# Patient Record
Sex: FEMALE | Race: WHITE | NOT HISPANIC OR LATINO | URBAN - METROPOLITAN AREA
[De-identification: names, ages, dates, MRNs, and addresses within clinical notes are randomized per-mention and may not be internally consistent; named-entity substitution may affect disease eponyms.]

---

## 2017-10-03 ENCOUNTER — EMERGENCY (EMERGENCY)
Facility: HOSPITAL | Age: 37
LOS: 0 days | Discharge: HOME | End: 2017-10-03

## 2017-10-03 DIAGNOSIS — R23.1 PALLOR: ICD-10-CM

## 2017-10-03 DIAGNOSIS — R42 DIZZINESS AND GIDDINESS: ICD-10-CM

## 2017-10-03 DIAGNOSIS — N39.0 URINARY TRACT INFECTION, SITE NOT SPECIFIED: ICD-10-CM

## 2018-01-22 ENCOUNTER — APPOINTMENT (OUTPATIENT)
Dept: OTOLARYNGOLOGY | Facility: CLINIC | Age: 38
End: 2018-01-22

## 2019-06-19 ENCOUNTER — APPOINTMENT (OUTPATIENT)
Dept: VASCULAR SURGERY | Facility: CLINIC | Age: 39
End: 2019-06-19
Payer: MEDICAID

## 2019-06-19 VITALS
DIASTOLIC BLOOD PRESSURE: 70 MMHG | SYSTOLIC BLOOD PRESSURE: 105 MMHG | HEIGHT: 66 IN | BODY MASS INDEX: 25.71 KG/M2 | WEIGHT: 160 LBS

## 2019-06-19 DIAGNOSIS — I83.813 VARICOSE VEINS OF BILATERAL LOWER EXTREMITIES WITH PAIN: ICD-10-CM

## 2019-06-19 DIAGNOSIS — I83.893 VARICOSE VEINS OF BILATERAL LOWER EXTREMITIES WITH OTHER COMPLICATIONS: ICD-10-CM

## 2019-06-19 DIAGNOSIS — Z78.9 OTHER SPECIFIED HEALTH STATUS: ICD-10-CM

## 2019-06-19 PROCEDURE — 93970 EXTREMITY STUDY: CPT

## 2019-06-19 PROCEDURE — 99203 OFFICE O/P NEW LOW 30 MIN: CPT

## 2019-06-19 RX ORDER — ASPIRIN 81 MG
81 TABLET,CHEWABLE ORAL
Refills: 0 | Status: ACTIVE | COMMUNITY

## 2019-06-19 NOTE — ASSESSMENT
[FreeTextEntry1] : 38 years old female with chronic b/l LE spider veins in thighs and legs. No leg swelling or pain. Venous duplex of both legs wad done today for swelling and varicose veins and showed no reflux in b/l GSV and no DVT. She has no indication for ablation and has mild veins. I offered sclerotherapy if she wishes for cosmetic reason, she is thinking. Prescribed stockings and leg elevation. She will f/u on as needed basis.

## 2019-06-19 NOTE — PHYSICAL EXAM
[Normal Breath Sounds] : Normal breath sounds [JVD] : no jugular venous distention  [Normal Heart Sounds] : normal heart sounds [2+] : left 2+ [Ankle Swelling (On Exam)] : not present [Varicose Veins Of Lower Extremities] : bilaterally [Ankle Swelling On The Right] : mild [Abdomen Masses] : No abdominal masses [] : not present [Abdominal Bruit] : no abdominal bruit  [Abdomen Tenderness] : ~T ~M No abdominal tenderness [FreeTextEntry1] : diffuse spider veins b/l thighs and legs. non tender

## 2019-06-19 NOTE — CONSULT LETTER
[Dear  ___] : Dear  [unfilled], [Please see my note below.] : Please see my note below. [Consult Closing:] : Thank you very much for allowing me to participate in the care of this patient.  If you have any questions, please do not hesitate to contact me. [Consult Letter:] : I had the pleasure of evaluating your patient, [unfilled].

## 2019-06-19 NOTE — REVIEW OF SYSTEMS
[Fever] : no fever [Chills] : no chills [Chest Pain] : no chest pain [Shortness Of Breath] : no shortness of breath [Abdominal Pain] : no abdominal pain [Constipation] : no constipation [Vomiting] : no vomiting [Diarrhea] : no diarrhea [Limb Pain] : no limb pain [Dizziness] : no dizziness [Limb Swelling] : no limb swelling

## 2019-06-19 NOTE — DATA REVIEWED
[FreeTextEntry1] : Venous duplex of both legs wad done today for swelling and varicose veins and showed no reflux in b/l GSV and no DVT.

## 2019-06-19 NOTE — HISTORY OF PRESENT ILLNESS
[FreeTextEntry1] : 38 years old female with chronic b/l LE spider veins in thighs and legs. No leg swelling or pain. Not using stockings. Was told by a physician that she needs closure of vein. No history of DVT or large varicose veins. Was told during pregnancy that she might have a tendency to form clots, no known hypercoagulable disease. \par

## 2019-06-19 NOTE — REASON FOR VISIT
[Consultation] : a consultation visit [FreeTextEntry1] : Elza is being seen today for spider varicose veins in both lower extremities

## 2021-09-14 ENCOUNTER — APPOINTMENT (OUTPATIENT)
Dept: UROLOGY | Facility: CLINIC | Age: 41
End: 2021-09-14
Payer: MEDICAID

## 2021-09-14 VITALS — HEIGHT: 66 IN | WEIGHT: 160 LBS | BODY MASS INDEX: 25.71 KG/M2

## 2021-09-14 DIAGNOSIS — F17.290 NICOTINE DEPENDENCE, OTHER TOBACCO PRODUCT, UNCOMPLICATED: ICD-10-CM

## 2021-09-14 PROCEDURE — 99204 OFFICE O/P NEW MOD 45 MIN: CPT

## 2021-09-14 NOTE — HISTORY OF PRESENT ILLNESS
[FreeTextEntry1] : MARLEY FRIEDMAN is a 41 year old female heavy smoker [employer], with past medical history of UTIs who presents for consultation for recurrent UTIs in the past 2 years. \par \par Pt is here with her step-daughter which also served as an  (Wolof-English). Pt declined official . \par Pt reports during the time of UTIs she is experiencing, headaches, dizziness, malaise, suprapubic pressure, increased urinary frequency, urgency and a sensation of incomplete emptying. \par Pt reports visits the Urgent care every time she experiences these symptoms and she gets Rxed ABx which she takes, with relief of symptoms and the symptoms reoccur 10 days after finishing the ABx. \par Denies gross hematuria, dysuria or associated symptoms at this time. \par Pt states in the past she used to wipe back to front and she recently changed from front to back as instructed by the PCP. \par \par Pt reports she consumes 3 coffees per day and states she doesn’t hydrate much during the day. \par Reports she is  and states has vaginal intercourse with her . \par \par  PMH: reports at least 1 UTI per year \par Family History: No  malignancies\par Social History: . Vaginal [place]. Consumes 3 coffees per day. Pt is COVID19 vaccinated. \par Hookah smoker. Allergic to Cipro. Daughter Pharmacy major. \par \par UCx: \par 8/26/2021: E.Coli resist to cipro/levaquin\par 7/08/2021: E.Coli \par 6/30/2021: E. Coli \par 5/26/2021 E.Coli  \par

## 2021-09-14 NOTE — PHYSICAL EXAM
[General Appearance - Well Developed] : well developed [General Appearance - Well Nourished] : well nourished [Normal Appearance] : normal appearance [Well Groomed] : well groomed [General Appearance - In No Acute Distress] : no acute distress [Edema] : no peripheral edema [Exaggerated Use Of Accessory Muscles For Inspiration] : no accessory muscle use [Respiration, Rhythm And Depth] : normal respiratory rhythm and effort [Abdomen Tenderness] : non-tender [Abdomen Soft] : soft [Costovertebral Angle Tenderness] : no ~M costovertebral angle tenderness [Urinary Bladder Findings] : the bladder was normal on palpation [Normal Station and Gait] : the gait and station were normal for the patient's age [] : no rash [No Focal Deficits] : no focal deficits [Oriented To Time, Place, And Person] : oriented to person, place, and time [Affect] : the affect was normal [Mood] : the mood was normal [Not Anxious] : not anxious [No Palpable Adenopathy] : no palpable adenopathy

## 2021-09-14 NOTE — ASSESSMENT
[FreeTextEntry1] : MARLEY FRIEDMAN is a 41 year old female heavy smoker [employer], with past medical history of UTIs who presents for consultation for recurrent UTIs in the past 2 years, culture confirmed ecoli.\par \par Currently asymptomatic.\par Next time she has symptoms  she can come in for a STRAIGHT CATHETER CULTURE\par \par Plan: \par RBUS \par UCx Straight Cath when she is experiencing UTI symptoms. \par UA, Urine Cytology, UCx today (no plans on tx if positive today unless symptomatic)\par STI panel \par Start theraCran \par Increase fluid intake \par Methenamine vs Low dose ABx prophylaxis in future

## 2021-09-14 NOTE — ADDENDUM
[FreeTextEntry1] : Patient's note was transcribed with the assistance of a medical scribe under the supervision of Dr. Sood.\par I, Dr. Sood, have reviewed the patient's chart and agree that it aligns with my medical decisions.\par Philippe Borden, our scribe, also served as a chaperone for physical examination purposes.\par \par \par

## 2021-09-16 ENCOUNTER — NON-APPOINTMENT (OUTPATIENT)
Age: 41
End: 2021-09-16

## 2021-09-16 LAB
C TRACH RRNA SPEC QL NAA+PROBE: NOT DETECTED
N GONORRHOEA RRNA SPEC QL NAA+PROBE: NOT DETECTED
SOURCE AMPLIFICATION: NORMAL
URINE CYTOLOGY: NORMAL

## 2021-09-17 ENCOUNTER — NON-APPOINTMENT (OUTPATIENT)
Age: 41
End: 2021-09-17

## 2021-09-17 LAB
SOURCE AMPLIFICATION: NORMAL
T VAGINALIS RRNA SPEC QL NAA+PROBE: NOT DETECTED

## 2021-09-20 ENCOUNTER — NON-APPOINTMENT (OUTPATIENT)
Age: 41
End: 2021-09-20

## 2021-09-22 ENCOUNTER — RESULT REVIEW (OUTPATIENT)
Age: 41
End: 2021-09-22

## 2021-09-22 ENCOUNTER — OUTPATIENT (OUTPATIENT)
Dept: OUTPATIENT SERVICES | Facility: HOSPITAL | Age: 41
LOS: 1 days | Discharge: HOME | End: 2021-09-22
Payer: COMMERCIAL

## 2021-09-22 DIAGNOSIS — N39.0 URINARY TRACT INFECTION, SITE NOT SPECIFIED: ICD-10-CM

## 2021-09-22 PROCEDURE — 76770 US EXAM ABDO BACK WALL COMP: CPT | Mod: 26

## 2021-09-23 ENCOUNTER — NON-APPOINTMENT (OUTPATIENT)
Age: 41
End: 2021-09-23

## 2021-10-05 ENCOUNTER — APPOINTMENT (OUTPATIENT)
Dept: UROLOGY | Facility: CLINIC | Age: 41
End: 2021-10-05
Payer: MEDICAID

## 2021-10-05 VITALS — HEIGHT: 66 IN | WEIGHT: 179 LBS | BODY MASS INDEX: 28.77 KG/M2

## 2021-10-05 PROCEDURE — 99214 OFFICE O/P EST MOD 30 MIN: CPT

## 2021-10-05 NOTE — HISTORY OF PRESENT ILLNESS
[FreeTextEntry1] : MARLEY FRIEDMAN is a 41 year old female heavy smoker [employer], with past medical history of UTIs who presents for consultation for recurrent UTIs in the past 2 years. \par Pt is here with her  which also served as an  (Thai-English). Pt declined official .\par \par Urine testing was negative for gonorrhea/chlamydia, trichomonas, with negative urinalysis.  Culture demonstrated E. coli however, patient was contacted and is asymptomatic.\par \par Renal/bladder ultrasound images visualized demonstrated no hydronephrosis bilaterally with a lower pole 7 x 6 mm focus, possibly nonobstructing stone, in the right lower pole. 9/2021\par \par Patient is doing well and denies any signs or symptoms of urinary infection at this time.\par \par Previously: \par  PMH: reports at least 1 UTI per year \par Family History: No  malignancies\par Social History: . Vaginal [place]. Consumes 3 coffees per day. Pt is COVID19 vaccinated. \par Hookah smoker. Allergic to Cipro. Daughter Pharmacy major. \par \par UCx: \par 8/26/2021: E.Coli resist to cipro/levaquin\par 7/08/2021: E.Coli \par 6/30/2021: E. Coli \par 5/26/2021 E.Coli  \par

## 2021-10-06 ENCOUNTER — OUTPATIENT (OUTPATIENT)
Dept: OUTPATIENT SERVICES | Facility: HOSPITAL | Age: 41
LOS: 1 days | Discharge: HOME | End: 2021-10-06

## 2021-10-07 DIAGNOSIS — K02.9 DENTAL CARIES, UNSPECIFIED: ICD-10-CM

## 2021-10-11 ENCOUNTER — APPOINTMENT (OUTPATIENT)
Dept: UROGYNECOLOGY | Facility: CLINIC | Age: 41
End: 2021-10-11

## 2021-10-19 ENCOUNTER — LABORATORY RESULT (OUTPATIENT)
Age: 41
End: 2021-10-19

## 2021-10-19 ENCOUNTER — APPOINTMENT (OUTPATIENT)
Dept: UROLOGY | Facility: CLINIC | Age: 41
End: 2021-10-19
Payer: MEDICAID

## 2021-10-19 VITALS — HEIGHT: 66 IN | WEIGHT: 179 LBS | BODY MASS INDEX: 28.77 KG/M2

## 2021-10-19 PROCEDURE — 99214 OFFICE O/P EST MOD 30 MIN: CPT

## 2021-10-22 NOTE — HISTORY OF PRESENT ILLNESS
[FreeTextEntry1] : MARLEY FRIEDMAN is a 41 year old female heavy smoker hookah, with past medical history of UTIs who presents for consultation for recurrent UTIs in the past 2 years. \par Pt is here with her  which also served as an  (Japanese-English). Pt declined official .\par \par Approximately 1 week ago she began experiencing dysuria and presented to her PCP who ordered urine culture.\par Urine culture demonstrated E. coli and she was given an unknown antibiotic. She did not take this medication and presents today for evaluation.\par Denies fever, chills, nausea/vomiting, abdominal/flank pain, or further constitutional symptoms.\par \par Additionally, she has history of renal stone.\par Renal ultrasound images visualized from 9/22/2021, demonstrates right lower pole stone, 7 x 6 mm. Urinary bladder demonstrates bilateral ureteral jets with a prevoid volume of 246 cc and the postvoid volume of 41 cc. No hydronephrosis bilaterally.\par \par KUB x-ray, from 10/12/2021 demonstrates a 7 mm calcification over the right renal shadow, which may represent calculus. \par \par Previously:\par Urine testing was negative for gonorrhea/chlamydia, trichomonas, with negative urinalysis.  Culture demonstrated E. coli however, patient was contacted and is asymptomatic.\par \par Renal/bladder ultrasound images visualized demonstrated no hydronephrosis bilaterally with a lower pole 7 x 6 mm focus, possibly nonobstructing stone, in the right lower pole. 9/2021\par \par  PMH: reports at least 1 UTI per year \par Family History: No  malignancies\par Social History: . Vaginal [place]. Consumes 3 coffees per day. Pt is COVID19 vaccinated. \par Hookah smoker. Allergic to Cipro. Daughter Pharmacy major. \par \par UCx: \par 8/26/2021: E.Coli resist to cipro/levaquin\par 7/08/2021: E.Coli \par 6/30/2021: E. Coli \par 5/26/2021 E.Coli  \par

## 2021-10-22 NOTE — ASSESSMENT
[FreeTextEntry1] : MARLEY FRIEDMAN is a 41 year old female heavy smoker [employer], with past medical history of UTIs who presents for consultation for recurrent UTIs in the past 2 years. \par Pt is here with her  which also served as an  (Turkmen-English). Pt declined official .\par Currently symptomatic. Urine culture demonstrates E. coli. Right 7 mm stone confirmed on KUB and renal ultrasound\par \par Recommended straight catheterization however, patient declined.\par \par -Start Macrobid 100 mg p.o. twice daily x7 days, as per her most recent urine culture.\par -UA C&S today\par -Consider low-dose antibiotic versus methenamine for prophylaxis\par -Patient will bring in disc containing the images of KUB x-ray for review\par -Telemedicine in 2 to 3 weeks to discuss definitive stone management\par

## 2021-10-26 ENCOUNTER — NON-APPOINTMENT (OUTPATIENT)
Age: 41
End: 2021-10-26

## 2021-10-26 LAB
APPEARANCE: CLEAR
BILIRUBIN URINE: NEGATIVE
BLOOD URINE: NORMAL
COLOR: YELLOW
GLUCOSE QUALITATIVE U: NEGATIVE
KETONES URINE: NEGATIVE
LEUKOCYTE ESTERASE URINE: ABNORMAL
NITRITE URINE: POSITIVE
PH URINE: 6
PROTEIN URINE: NORMAL
SPECIFIC GRAVITY URINE: 1.02
UROBILINOGEN URINE: NORMAL

## 2021-11-09 ENCOUNTER — APPOINTMENT (OUTPATIENT)
Dept: UROLOGY | Facility: CLINIC | Age: 41
End: 2021-11-09
Payer: MEDICAID

## 2021-11-09 PROCEDURE — 99214 OFFICE O/P EST MOD 30 MIN: CPT | Mod: 95

## 2021-11-09 NOTE — HISTORY OF PRESENT ILLNESS
[Home] : at home, [unfilled] , at the time of the visit. [Medical Office: ([facility])___] : at the medical office located in  [Verbal consent obtained from patient] : the patient, [unfilled] [FreeTextEntry1] : MARLEY FRIEDMAN is a 41 year old female heavy smoker [employer], with past medical history of UTIs who presents for consultation for recurrent UTIs in the past 2 years. \par \par Pt is here with her  which also served as an  (Indonesian-English). Pt declined official .\par Reports resolution of her dysuria urinary symptoms.  Doing well denies hematuria.  This is after antibiotic treatment\par \par Urine culture E. coli greater than 100,000\par Urinalysis negative for lab threshold microscopic hematuria\par \par Additionally, she has history of renal stone.\par Renal ultrasound images from 9/22/2021, demonstrates right lower pole stone, 7 x 6 mm. Urinary bladder demonstrates bilateral ureteral jets with a prevoid volume of 246 cc and the postvoid volume of 41 cc. No hydronephrosis bilaterally.\par \par KUB x-ray, from 10/12/2021 demonstrates a 7 mm calcification over the right renal shadow, which may represent calculus. \par \par Previously:\par Renal/bladder ultrasound images demonstrated no hydronephrosis bilaterally with a lower pole 7 x 6 mm focus, possibly nonobstructing stone, in the right lower pole. 9/2021\par \par  PMH: reports at least 1 UTI per year \par Family History: No  malignancies\par Social History: . Vaginal [place]. Consumes 3 coffees per day. Pt is COVID19 vaccinated. \par Hookah smoker. Allergic to Cipro. Daughter Pharmacy major. \par \par UCx: \par 8/26/2021: E.Coli resist to cipro/levaquin\par 7/08/2021: E.Coli \par 6/30/2021: E. Coli \par 5/26/2021 E.Coli  \par

## 2021-11-09 NOTE — ASSESSMENT
[FreeTextEntry1] : MARLEY FRIEDMAN is a 41 year old female heavy smoker [employer], with past medical history of UTIs who presents for consultation for recurrent UTIs in the past 2 years. Right 7 mm stone confirmed on KUB and renal ultrasound. \par \par Unable to obtain images from osh\par recommend KUB, pt interested in eswl\par methenamine Rx for uti prevention\par other conservative measures such as hydration discussed with patient for stone prevention and UTI prevention\par Follow-up 6 weeks KUB prior

## 2021-12-11 ENCOUNTER — OUTPATIENT (OUTPATIENT)
Dept: OUTPATIENT SERVICES | Facility: HOSPITAL | Age: 41
LOS: 1 days | Discharge: HOME | End: 2021-12-11
Payer: COMMERCIAL

## 2021-12-11 ENCOUNTER — RESULT REVIEW (OUTPATIENT)
Age: 41
End: 2021-12-11

## 2021-12-11 DIAGNOSIS — N20.0 CALCULUS OF KIDNEY: ICD-10-CM

## 2021-12-11 PROCEDURE — 74019 RADEX ABDOMEN 2 VIEWS: CPT | Mod: 26

## 2021-12-13 ENCOUNTER — NON-APPOINTMENT (OUTPATIENT)
Age: 41
End: 2021-12-13

## 2021-12-23 ENCOUNTER — APPOINTMENT (OUTPATIENT)
Dept: UROLOGY | Facility: CLINIC | Age: 41
End: 2021-12-23
Payer: MEDICAID

## 2021-12-23 PROCEDURE — 99214 OFFICE O/P EST MOD 30 MIN: CPT | Mod: 95

## 2021-12-23 NOTE — HISTORY OF PRESENT ILLNESS
[Home] : at home, [unfilled] , at the time of the visit. [Medical Office: ([facility] [place] [facility])___] : at the medical office located in  [Verbal consent obtained from patient] : the patient, [unfilled] [FreeTextEntry1] : MARLEY FRIEDMAN is a 41 year old female heavy smoker [employer], with past medical history of UTIs who presents for consultation for recurrent UTIs in the past 2 years. Right 7 mm stone confirmed on KUB and renal ultrasound. \par \par Pt is here with her  which also served as an  (Chinese-English). Pt declined official .\par Reports resolution of her dysuria urinary symptoms.  Doing well he is taking the phentermine without any issues\par \par KUB from 12/2021 images visualized: 6-7 mm right lower pole renal calculus.\par \par Previously: \par Urine culture E. coli greater than 100,000\par Urinalysis negative for lab threshold microscopic hematuria\par \par Additionally, she has history of renal stone.\par Renal ultrasound images from 9/22/2021, demonstrates right lower pole stone, 7 x 6 mm. Urinary bladder demonstrates bilateral ureteral jets with a prevoid volume of 246 cc and the postvoid volume of 41 cc. No hydronephrosis bilaterally.\par \par KUB x-ray, from 10/12/2021 demonstrates a 7 mm calcification over the right renal shadow, which may represent calculus. \par \par Previously:\par Renal/bladder ultrasound images demonstrated no hydronephrosis bilaterally with a lower pole 7 x 6 mm focus, possibly nonobstructing stone, in the right lower pole. 9/2021\par \par  PMH: reports at least 1 UTI per year \par Family History: No  malignancies\par Social History: . Vaginal [place]. Consumes 3 coffees per day. Pt is COVID19 vaccinated. \par [employer] smoker. Allergic to Cipro. Daughter Pharmacy major. \par \par UCx: \par 8/26/2021: E.Coli resist to cipro/levaquin\par 7/08/2021: E.Coli \par 6/30/2021: E. Coli \par 5/26/2021 E.Coli  \par

## 2021-12-23 NOTE — ASSESSMENT
[FreeTextEntry1] : MARLEY FRIEDMAN is a 41 year old female heavy smoker [employer], with past medical history of UTIs who presents for consultation for recurrent UTIs in the past 2 years. Right 7 mm stone confirmed on KUB and renal ultrasound. \par \par Pt elects to undergo right ESWL.  Potentially higher risk of infection given history of bacteriuria though low chances in general with shockwave lithotripsy\par methenamine Rx continue for uti prevention\par other conservative measures such as hydration for stone prevention and UTI prevention\par \par Our stone treatment discussion summarized--\par 1. Surveillance: we discussed risks associated with this approach including increase in size of stone, UTIs, renal obstruction.\par \par 2. URS/LL: we discussed how this is done (transurethrally with small cameras, baskets and a laser), the risks (bleeding, infection, damage to  organs, stricture, inability to access the ureter, stent pain which can be mild, moderate or severe) and benefits (minimally invasive). The patient also understands that if our scopes will not fit into the ureter because the ureter is too small, the patient will be stented and left to dilate with a stent ~2 weeks. We will then re-attempt the ureteroscopy. \par \par 3. ESWL: we discussed how this procedure is performed (transcorporeal shock waves under light anesthesia), the risks (bleeding, failure to fragment stones, steinstrasse) and benefits (least invasive technique). \par \par For these treatment, we also discussed the possible need for additional therapies for persistent stone burden. \par We stressed that when ureteral stents are inserted they are temporary and must be removed within 3 months of placement. Otherwise encrustation, urosepsis, obstruction can occur.\par  Patient is not on anticoagulation.\par Based on the location and size of the patient's stone burden, I recommended *** and the patient agreed.\par \par

## 2022-01-10 ENCOUNTER — APPOINTMENT (OUTPATIENT)
Dept: UROLOGY | Facility: CLINIC | Age: 42
End: 2022-01-10

## 2022-01-24 ENCOUNTER — OUTPATIENT (OUTPATIENT)
Dept: OUTPATIENT SERVICES | Facility: HOSPITAL | Age: 42
LOS: 1 days | Discharge: HOME | End: 2022-01-24
Payer: COMMERCIAL

## 2022-01-24 VITALS
TEMPERATURE: 96 F | DIASTOLIC BLOOD PRESSURE: 71 MMHG | HEART RATE: 73 BPM | HEIGHT: 66 IN | OXYGEN SATURATION: 98 % | RESPIRATION RATE: 18 BRPM | SYSTOLIC BLOOD PRESSURE: 118 MMHG | WEIGHT: 180.78 LBS

## 2022-01-24 DIAGNOSIS — N20.0 CALCULUS OF KIDNEY: ICD-10-CM

## 2022-01-24 DIAGNOSIS — Z01.818 ENCOUNTER FOR OTHER PREPROCEDURAL EXAMINATION: ICD-10-CM

## 2022-01-24 DIAGNOSIS — Z98.891 HISTORY OF UTERINE SCAR FROM PREVIOUS SURGERY: Chronic | ICD-10-CM

## 2022-01-24 LAB
ALBUMIN SERPL ELPH-MCNC: 4.4 G/DL — SIGNIFICANT CHANGE UP (ref 3.5–5.2)
ALP SERPL-CCNC: 89 U/L — SIGNIFICANT CHANGE UP (ref 30–115)
ALT FLD-CCNC: 20 U/L — SIGNIFICANT CHANGE UP (ref 0–41)
ANION GAP SERPL CALC-SCNC: 12 MMOL/L — SIGNIFICANT CHANGE UP (ref 7–14)
APPEARANCE UR: CLEAR — SIGNIFICANT CHANGE UP
APTT BLD: 30.4 SEC — SIGNIFICANT CHANGE UP (ref 27–39.2)
AST SERPL-CCNC: 16 U/L — SIGNIFICANT CHANGE UP (ref 0–41)
BASOPHILS # BLD AUTO: 0.07 K/UL — SIGNIFICANT CHANGE UP (ref 0–0.2)
BASOPHILS NFR BLD AUTO: 1 % — SIGNIFICANT CHANGE UP (ref 0–1)
BILIRUB SERPL-MCNC: 0.5 MG/DL — SIGNIFICANT CHANGE UP (ref 0.2–1.2)
BILIRUB UR-MCNC: NEGATIVE — SIGNIFICANT CHANGE UP
BUN SERPL-MCNC: 9 MG/DL — LOW (ref 10–20)
CALCIUM SERPL-MCNC: 9.3 MG/DL — SIGNIFICANT CHANGE UP (ref 8.5–10.1)
CHLORIDE SERPL-SCNC: 102 MMOL/L — SIGNIFICANT CHANGE UP (ref 98–110)
CO2 SERPL-SCNC: 22 MMOL/L — SIGNIFICANT CHANGE UP (ref 17–32)
COLOR SPEC: SIGNIFICANT CHANGE UP
CREAT SERPL-MCNC: 0.5 MG/DL — LOW (ref 0.7–1.5)
DIFF PNL FLD: NEGATIVE — SIGNIFICANT CHANGE UP
EOSINOPHIL # BLD AUTO: 0.22 K/UL — SIGNIFICANT CHANGE UP (ref 0–0.7)
EOSINOPHIL NFR BLD AUTO: 3 % — SIGNIFICANT CHANGE UP (ref 0–8)
GLUCOSE SERPL-MCNC: 69 MG/DL — LOW (ref 70–99)
GLUCOSE UR QL: NEGATIVE — SIGNIFICANT CHANGE UP
HCT VFR BLD CALC: 41.3 % — SIGNIFICANT CHANGE UP (ref 37–47)
HGB BLD-MCNC: 13.8 G/DL — SIGNIFICANT CHANGE UP (ref 12–16)
IMM GRANULOCYTES NFR BLD AUTO: 0.3 % — SIGNIFICANT CHANGE UP (ref 0.1–0.3)
INR BLD: 0.98 RATIO — SIGNIFICANT CHANGE UP (ref 0.65–1.3)
KETONES UR-MCNC: NEGATIVE — SIGNIFICANT CHANGE UP
LEUKOCYTE ESTERASE UR-ACNC: NEGATIVE — SIGNIFICANT CHANGE UP
LYMPHOCYTES # BLD AUTO: 1.87 K/UL — SIGNIFICANT CHANGE UP (ref 1.2–3.4)
LYMPHOCYTES # BLD AUTO: 25.8 % — SIGNIFICANT CHANGE UP (ref 20.5–51.1)
MCHC RBC-ENTMCNC: 28.3 PG — SIGNIFICANT CHANGE UP (ref 27–31)
MCHC RBC-ENTMCNC: 33.4 G/DL — SIGNIFICANT CHANGE UP (ref 32–37)
MCV RBC AUTO: 84.6 FL — SIGNIFICANT CHANGE UP (ref 81–99)
MONOCYTES # BLD AUTO: 0.53 K/UL — SIGNIFICANT CHANGE UP (ref 0.1–0.6)
MONOCYTES NFR BLD AUTO: 7.3 % — SIGNIFICANT CHANGE UP (ref 1.7–9.3)
NEUTROPHILS # BLD AUTO: 4.53 K/UL — SIGNIFICANT CHANGE UP (ref 1.4–6.5)
NEUTROPHILS NFR BLD AUTO: 62.6 % — SIGNIFICANT CHANGE UP (ref 42.2–75.2)
NITRITE UR-MCNC: NEGATIVE — SIGNIFICANT CHANGE UP
NRBC # BLD: 0 /100 WBCS — SIGNIFICANT CHANGE UP (ref 0–0)
PH UR: 6 — SIGNIFICANT CHANGE UP (ref 5–8)
PLATELET # BLD AUTO: 285 K/UL — SIGNIFICANT CHANGE UP (ref 130–400)
POTASSIUM SERPL-MCNC: 4.7 MMOL/L — SIGNIFICANT CHANGE UP (ref 3.5–5)
POTASSIUM SERPL-SCNC: 4.7 MMOL/L — SIGNIFICANT CHANGE UP (ref 3.5–5)
PROT SERPL-MCNC: 7.4 G/DL — SIGNIFICANT CHANGE UP (ref 6–8)
PROT UR-MCNC: NEGATIVE — SIGNIFICANT CHANGE UP
PROTHROM AB SERPL-ACNC: 11.3 SEC — SIGNIFICANT CHANGE UP (ref 9.95–12.87)
RBC # BLD: 4.88 M/UL — SIGNIFICANT CHANGE UP (ref 4.2–5.4)
RBC # FLD: 14.1 % — SIGNIFICANT CHANGE UP (ref 11.5–14.5)
SODIUM SERPL-SCNC: 136 MMOL/L — SIGNIFICANT CHANGE UP (ref 135–146)
SP GR SPEC: 1.01 — SIGNIFICANT CHANGE UP (ref 1.01–1.03)
UROBILINOGEN FLD QL: SIGNIFICANT CHANGE UP
WBC # BLD: 7.24 K/UL — SIGNIFICANT CHANGE UP (ref 4.8–10.8)
WBC # FLD AUTO: 7.24 K/UL — SIGNIFICANT CHANGE UP (ref 4.8–10.8)

## 2022-01-24 PROCEDURE — 93010 ELECTROCARDIOGRAM REPORT: CPT

## 2022-01-24 NOTE — H&P PST ADULT - HISTORY OF PRESENT ILLNESS
CURRENTLY  DENIES ANY CP, SOB, PALPITATIONS, COUGH OR DYSURIA  EXERCISE TOLERANCE 3 FOS WITHOUT SOB    PER PATIENT  this is his/her complete medical history including medications - PRESCRIPTIONS  OVER THE COUNTER MEDS    pt denies any covid s/s, or tested positive in the past.  Received covid vaccine  pt advised self quarantine till day of procedure    Anesthesia Alert  NO--Difficult Airway  NO--History of neck surgery or radiation  NO--Limited ROM of neck  NO--History of Malignant hyperthermia  NO--No personal or family history of Pseudocholinesterase deficiency.  NO--Prior Anesthesia Complication  NO--Latex Allergy  NO--Loose teeth  NO--History of Rheumatoid Arthritis  NO--REGGIE  NO--Bleeding risk  NO--Other_____    Patient verbalized understanding of instructions and was given the opportunity to ask questions and have them answered.

## 2022-01-24 NOTE — H&P PST ADULT - REASON FOR ADMISSION
40 Y/O F SCHEDULED FOR PAST FOR RIGHT EXTRACORPOREAL SHOCK WAVE LITHOTRIPSY ON 2/2/21 WITH DR ZAMARRIPA UNDER LSB. PT REPORTS RIGHT KIDNEY STONE (9MM) X 1 YEAR ASSOCIATED WITH FREQUENT UTI's. NOW FOR SCHEDULED PROCEDURE.

## 2022-01-27 RX ORDER — CEPHALEXIN 500 MG/1
500 CAPSULE ORAL
Qty: 14 | Refills: 0 | Status: ACTIVE | COMMUNITY
Start: 2022-01-27 | End: 1900-01-01

## 2022-01-30 ENCOUNTER — LABORATORY RESULT (OUTPATIENT)
Age: 42
End: 2022-01-30

## 2022-01-31 ENCOUNTER — NON-APPOINTMENT (OUTPATIENT)
Age: 42
End: 2022-01-31

## 2022-02-02 ENCOUNTER — OUTPATIENT (OUTPATIENT)
Dept: OUTPATIENT SERVICES | Facility: HOSPITAL | Age: 42
LOS: 1 days | Discharge: HOME | End: 2022-02-02
Payer: MEDICAID

## 2022-02-02 ENCOUNTER — APPOINTMENT (OUTPATIENT)
Dept: UROLOGY | Facility: HOSPITAL | Age: 42
End: 2022-02-02

## 2022-02-02 VITALS — DIASTOLIC BLOOD PRESSURE: 76 MMHG | SYSTOLIC BLOOD PRESSURE: 127 MMHG | HEART RATE: 71 BPM | RESPIRATION RATE: 15 BRPM

## 2022-02-02 VITALS
HEART RATE: 66 BPM | TEMPERATURE: 97 F | HEIGHT: 66 IN | OXYGEN SATURATION: 97 % | DIASTOLIC BLOOD PRESSURE: 52 MMHG | SYSTOLIC BLOOD PRESSURE: 92 MMHG | WEIGHT: 179.9 LBS | RESPIRATION RATE: 18 BRPM

## 2022-02-02 DIAGNOSIS — Z98.891 HISTORY OF UTERINE SCAR FROM PREVIOUS SURGERY: Chronic | ICD-10-CM

## 2022-02-02 PROBLEM — N20.0 CALCULUS OF KIDNEY: Chronic | Status: ACTIVE | Noted: 2022-01-24

## 2022-02-02 PROCEDURE — 50590 FRAGMENTING OF KIDNEY STONE: CPT | Mod: RT

## 2022-02-02 RX ORDER — SODIUM CHLORIDE 9 MG/ML
1000 INJECTION, SOLUTION INTRAVENOUS
Refills: 0 | Status: DISCONTINUED | OUTPATIENT
Start: 2022-02-02 | End: 2022-02-02

## 2022-02-02 RX ORDER — ONDANSETRON 8 MG/1
8 TABLET, FILM COATED ORAL ONCE
Refills: 0 | Status: DISCONTINUED | OUTPATIENT
Start: 2022-02-02 | End: 2022-02-02

## 2022-02-02 RX ORDER — TAMSULOSIN HYDROCHLORIDE 0.4 MG/1
0.4 CAPSULE ORAL
Qty: 30 | Refills: 0 | Status: ACTIVE | COMMUNITY
Start: 2022-02-02 | End: 1900-01-01

## 2022-02-02 RX ORDER — HYDROMORPHONE HYDROCHLORIDE 2 MG/ML
0.5 INJECTION INTRAMUSCULAR; INTRAVENOUS; SUBCUTANEOUS ONCE
Refills: 0 | Status: DISCONTINUED | OUTPATIENT
Start: 2022-02-02 | End: 2022-02-02

## 2022-02-02 RX ORDER — OXYCODONE AND ACETAMINOPHEN 5; 325 MG/1; MG/1
1 TABLET ORAL ONCE
Refills: 0 | Status: DISCONTINUED | OUTPATIENT
Start: 2022-02-02 | End: 2022-02-02

## 2022-02-02 RX ORDER — ONDANSETRON 8 MG/1
4 TABLET, FILM COATED ORAL ONCE
Refills: 0 | Status: COMPLETED | OUTPATIENT
Start: 2022-02-02 | End: 2022-02-02

## 2022-02-02 RX ADMIN — HYDROMORPHONE HYDROCHLORIDE 0.5 MILLIGRAM(S): 2 INJECTION INTRAMUSCULAR; INTRAVENOUS; SUBCUTANEOUS at 10:18

## 2022-02-02 RX ADMIN — HYDROMORPHONE HYDROCHLORIDE 0.5 MILLIGRAM(S): 2 INJECTION INTRAMUSCULAR; INTRAVENOUS; SUBCUTANEOUS at 10:53

## 2022-02-02 RX ADMIN — SODIUM CHLORIDE 75 MILLILITER(S): 9 INJECTION, SOLUTION INTRAVENOUS at 10:19

## 2022-02-02 RX ADMIN — ONDANSETRON 4 MILLIGRAM(S): 8 TABLET, FILM COATED ORAL at 11:22

## 2022-02-02 NOTE — CHART NOTE - NSCHARTNOTEFT_GEN_A_CORE
PACU ANESTHESIA ADMISSION NOTE      Procedure: Shockwave lithotripsy      Post op diagnosis:      ____  Intubated  TV:______       Rate: ______      FiO2: ______    _x___  Patent Airway    _x___  Full return of protective reflexes    ____  Full recovery from anesthesia / back to baseline     Vitals:   T:  98.4         R:    18              BP:  143/86                Sat:      100            P:  65      Mental Status:  __x__ Awake   ___x__ Alert   _____ Drowsy   _____ Sedated    Nausea/Vomiting:  _x___ NO  ______Yes,   See Post - Op Orders          Pain Scale (0-10):  __0___    Treatment: ____ None    ____ See Post - Op/PCA Orders    Post - Operative Fluids:   ____ Oral   __x__ See Post - Op Orders    Plan: Discharge:   ___x_Home       _____Floor     _____Critical Care    _____  Other:_________________    Comments:

## 2022-02-02 NOTE — ASU PATIENT PROFILE, ADULT - FALL HARM RISK - HARM RISK INTERVENTIONS

## 2022-02-02 NOTE — ASU DISCHARGE PLAN (ADULT/PEDIATRIC) - ASU DC SPECIAL INSTRUCTIONSFT
Please take flomax for the next month ( at pharmacy) to help pass stones. Tylenol should be sufficient as needed for pain. Drinks approximately 3 liters of water every day. If you develop fevers/chills >100.4, visit emergency room immediately and inform Dr Sood's office.  The office will call you for a follow up appointment with Dr Sood. Please obtain an xray a few days before your appointment which the office will arrange.  You will be given a strainer to collect stones with urination. Save any stones and bring them to your next appointment.

## 2022-02-02 NOTE — ASU DISCHARGE PLAN (ADULT/PEDIATRIC) - NS MD DC FALL RISK RISK
For information on Fall & Injury Prevention, visit: [email or web address] OR  [email or web address] OR  https://www.cdc.gov/steadi/patient.html

## 2022-02-07 DIAGNOSIS — Z88.1 ALLERGY STATUS TO OTHER ANTIBIOTIC AGENTS STATUS: ICD-10-CM

## 2022-02-07 DIAGNOSIS — N20.0 CALCULUS OF KIDNEY: ICD-10-CM

## 2022-03-03 ENCOUNTER — RESULT REVIEW (OUTPATIENT)
Age: 42
End: 2022-03-03

## 2022-03-03 ENCOUNTER — NON-APPOINTMENT (OUTPATIENT)
Age: 42
End: 2022-03-03

## 2022-03-03 ENCOUNTER — OUTPATIENT (OUTPATIENT)
Dept: OUTPATIENT SERVICES | Facility: HOSPITAL | Age: 42
LOS: 1 days | Discharge: HOME | End: 2022-03-03
Payer: MEDICAID

## 2022-03-03 DIAGNOSIS — N20.0 CALCULUS OF KIDNEY: ICD-10-CM

## 2022-03-03 DIAGNOSIS — Z98.891 HISTORY OF UTERINE SCAR FROM PREVIOUS SURGERY: Chronic | ICD-10-CM

## 2022-03-03 PROCEDURE — 74019 RADEX ABDOMEN 2 VIEWS: CPT | Mod: 26

## 2022-03-15 ENCOUNTER — APPOINTMENT (OUTPATIENT)
Dept: UROLOGY | Facility: CLINIC | Age: 42
End: 2022-03-15
Payer: MEDICAID

## 2022-03-15 PROCEDURE — 99214 OFFICE O/P EST MOD 30 MIN: CPT | Mod: 24,95

## 2022-03-15 RX ORDER — NITROFURANTOIN (MONOHYDRATE/MACROCRYSTALS) 25; 75 MG/1; MG/1
100 CAPSULE ORAL TWICE DAILY
Qty: 14 | Refills: 0 | Status: COMPLETED | COMMUNITY
Start: 2021-10-19 | End: 2022-03-15

## 2022-03-15 NOTE — HISTORY OF PRESENT ILLNESS
[Home] : at home, [unfilled] , at the time of the visit. [Medical Office: ([facility])___] : at the medical office located in  [Verbal consent obtained from patient] : the patient, [unfilled] [FreeTextEntry1] : MARLEY FRIEDMAN is a 41 year old female heavy smoker hookah, with past medical history of UTIs who presents for consultation for recurrent UTIs in the past 2 years. Right 7 mm stone confirmed on KUB and renal ultrasound. sp right eswl stone appears to have fragmented into multiple tiny fragments 2/2022. \par \par Doing well denies flank pain or hematuria. she passed some stone fragments \par Pt is here with her  which also served as an  (Danish-English). Pt declined official .\par \par KUB images visualized from 3/2022: No definitive calcifications overlying the expected regions of the kidneys. IUD overlying the pelvis. Nonobstructive bowel gas pattern.\par \par Previously: \par \par KUB from 12/2021 images visualized: 6-7 mm right lower pole renal calculus.\par \par Previously: \par Urine culture E. coli greater than 100,000\par Urinalysis negative for lab threshold microscopic hematuria\par \par Additionally, she has history of renal stone.\par Renal ultrasound images from 9/22/2021, demonstrates right lower pole stone, 7 x 6 mm. Urinary bladder demonstrates bilateral ureteral jets with a prevoid volume of 246 cc and the postvoid volume of 41 cc. No hydronephrosis bilaterally.\par \par KUB x-ray, from 10/12/2021 demonstrates a 7 mm calcification over the right renal shadow, which may represent calculus. \par \par Previously:\par Renal/bladder ultrasound images demonstrated no hydronephrosis bilaterally with a lower pole 7 x 6 mm focus, possibly nonobstructing stone, in the right lower pole. 9/2021\par \par  PMH: reports at least 1 UTI per year \par Family History: No  malignancies\par Social History: . Vaginal [place]. Consumes 3 coffees per day. Pt is COVID19 vaccinated. \par Hookah smoker. Allergic to Cipro. Daughter Pharmacy major. \par \par UCx: \par 8/26/2021: E.Coli resist to cipro/levaquin\par 7/08/2021: E.Coli \par 6/30/2021: E. Coli \par 5/26/2021 E.Coli  \par

## 2022-03-15 NOTE — ASSESSMENT
[FreeTextEntry1] : MARLEY FRIEDMAN is a 41 year old female heavy smoker [employer], with past medical history of UTIs who presents for consultation for recurrent UTIs in the past 2 years. Right 7 mm stone confirmed on KUB and renal ultrasound. sp right eswl stone appears to have fragmented into multiple tiny fragments 2/2022 kub confirms stone free.\par \par Fu 1 year renal sono prior\par methenamine Rx continue for uti prevention\par other conservative measures such as hydration for stone prevention and UTI prevention\par \par \par Stone nutritional counseling given--First and foremost, the most important recommendation is to increase water intake. I have recommended that she drink enough water to produce 2.5L of urine per day. More easily put, I have recommended the patient consume enough water to keep Her urine clear. I have also recommended adding crystal light (or lemon) which contains citrate which prevents stone formation. I have also stressed the importance of minimizing salt and animal protein in the diet.\par \par

## 2022-06-07 ENCOUNTER — APPOINTMENT (OUTPATIENT)
Dept: UROLOGY | Facility: CLINIC | Age: 42
End: 2022-06-07
Payer: MEDICAID

## 2022-06-07 ENCOUNTER — NON-APPOINTMENT (OUTPATIENT)
Age: 42
End: 2022-06-07

## 2022-06-07 LAB
BILIRUB UR QL STRIP: NORMAL
COLLECTION METHOD: NORMAL
GLUCOSE UR-MCNC: NORMAL
HCG UR QL: 0.2 EU/DL
HGB UR QL STRIP.AUTO: NORMAL
KETONES UR-MCNC: NORMAL
LEUKOCYTE ESTERASE UR QL STRIP: NORMAL
NITRITE UR QL STRIP: NORMAL
PH UR STRIP: 5
PROT UR STRIP-MCNC: NORMAL
SP GR UR STRIP: 1.02

## 2022-06-07 PROCEDURE — 99214 OFFICE O/P EST MOD 30 MIN: CPT

## 2022-06-07 PROCEDURE — 81003 URINALYSIS AUTO W/O SCOPE: CPT | Mod: QW

## 2022-06-07 NOTE — HISTORY OF PRESENT ILLNESS
[FreeTextEntry1] : MARLEY FRIEDMAN is a 41 year old female heavy smoker [employer]., with past medical history of UTIs who presents for consultation for recurrent UTIs in the past 2 years. Right 7 mm stone confirmed on KUB and renal ultrasound. sp right eswl stone appears to have fragmented into multiple tiny fragments 2/2022. \par \par 3 weeks ago patient began experiencing dysuria with worsening urinary symptoms.  She presented to an urgent care center which started her urine on empiric antibiotics for presumed urinary tract infection.  After about a week her symptoms did not improve and she returned.  They prescribed her Augmentin which resolved her symptoms.\par At the time of her infection she has some low back pain, which is now resolved, however she is concerned with her history of stones.\par She denies any flank pain, hematuria, dysuria, or change in urination at this time.  Her symptoms have now resolved.\par \par Previously:\par KUB images from 3/2022: No definitive calcifications overlying the expected regions of the kidneys. IUD overlying the pelvis. Nonobstructive bowel gas pattern.\par \par KUB from 12/2021 images visualized: 6-7 mm right lower pole renal calculus.\par \par Previously: \par Urine culture E. coli greater than 100,000\par Urinalysis negative for lab threshold microscopic hematuria\par \par Additionally, she has history of renal stone.\par Renal ultrasound images from 9/22/2021, demonstrates right lower pole stone, 7 x 6 mm. Urinary bladder demonstrates bilateral ureteral jets with a prevoid volume of 246 cc and the postvoid volume of 41 cc. No hydronephrosis bilaterally.\par \par KUB x-ray, from 10/12/2021 demonstrates a 7 mm calcification over the right renal shadow, which may represent calculus. \par \par Previously:\par Renal/bladder ultrasound images demonstrated no hydronephrosis bilaterally with a lower pole 7 x 6 mm focus, possibly nonobstructing stone, in the right lower pole. 9/2021\par \par  PMH: reports at least 1 UTI per year \par Family History: No  malignancies\par Social History: . Vaginal [place]. Consumes 3 coffees per day. Pt is COVID19 vaccinated. \par Hookah smoker. Allergic to Cipro. Daughter Pharmacy major. \par \par UCx: \par 8/26/2021: E.Coli resist to cipro/levaquin\par 7/08/2021: E.Coli \par 6/30/2021: E. Coli \par 5/26/2021 E.Coli  \par

## 2022-06-07 NOTE — ASSESSMENT
[FreeTextEntry1] : MARLEY FRIEDMAN is a 41 year old female heavy smoker Status[employer], with past medical history of UTIs who presents for consultation for recurrent UTIs in the past 2 years. Right 7 mm stone confirmed on KUB and renal ultrasound. sp right eswl stone appears to have fragmented into multiple tiny fragments 2/2022 kub confirms stone free.  \par \par Recent ?UTI with low back pain, now resolved on Augmentin. perhaps this was MSK pain and asymptomatic bacteriuria. \par \par Plan:\par -Pt will complete augmentin\par -Cont methenamine for uti prevention.  Patient not having any side effects at this time\par -Renal/bladder ultrasound\par -Follow-up to review\par -stone dietary prevention continue

## 2022-06-14 ENCOUNTER — OUTPATIENT (OUTPATIENT)
Dept: OUTPATIENT SERVICES | Facility: HOSPITAL | Age: 42
LOS: 1 days | Discharge: HOME | End: 2022-06-14
Payer: MEDICAID

## 2022-06-14 ENCOUNTER — RESULT REVIEW (OUTPATIENT)
Age: 42
End: 2022-06-14

## 2022-06-14 DIAGNOSIS — N39.0 URINARY TRACT INFECTION, SITE NOT SPECIFIED: ICD-10-CM

## 2022-06-14 DIAGNOSIS — Z98.891 HISTORY OF UTERINE SCAR FROM PREVIOUS SURGERY: Chronic | ICD-10-CM

## 2022-06-14 DIAGNOSIS — N20.0 CALCULUS OF KIDNEY: ICD-10-CM

## 2022-06-14 DIAGNOSIS — R82.71 BACTERIURIA: ICD-10-CM

## 2022-06-14 PROCEDURE — 76770 US EXAM ABDO BACK WALL COMP: CPT | Mod: 26

## 2022-06-28 ENCOUNTER — NON-APPOINTMENT (OUTPATIENT)
Age: 42
End: 2022-06-28

## 2022-07-19 ENCOUNTER — APPOINTMENT (OUTPATIENT)
Dept: UROLOGY | Facility: CLINIC | Age: 42
End: 2022-07-19

## 2022-07-19 PROCEDURE — 99214 OFFICE O/P EST MOD 30 MIN: CPT

## 2022-07-19 NOTE — ASSESSMENT
[FreeTextEntry1] : 3 mmMARLEY FRIEDMAN is a 41 year old female heavy smoker [employer], with past medical history of UTIs who presents for consultation for recurrent UTIs in the past 2 years. Right 7 mm stone confirmed on KUB and renal ultrasound. sp right eswl stone appears to have fragmented into multiple tiny fragments 2/2022.  History of recurrent urinary tract infection now managed on methenamine.  Renal/bladder ultrasound demonstrates punctate fragment in the right lower pole\par \par Plan:\par -Pt will continue methenamine\par -Renal/bladder ultrasound in 6 months\par -obs of punctate nephrolithiasis \par -Follow-up to review\par -stone dietary prevention continue

## 2022-07-19 NOTE — HISTORY OF PRESENT ILLNESS
[FreeTextEntry1] : MARLEY FRIEDMAN is a 41 year old female heavy smoker hookah, with past medical history of UTIs who presents for consultation for recurrent UTIs in the past 2 years. Right 7 mm stone confirmed on KUB and renal ultrasound. sp right eswl stone appears to have fragmented into multiple tiny fragments 2/2022.  History of recurrent urinary tract infection now managed on methenamine.\par \par Doing well denies any signs/symptoms of urinary tract infection.  Denies any episodes of renal colic.\par \par Renal/bladder ultrasound, from 6/28/2022, demonstrates 3 mm lower pole calculus.  Urinary bladder prevoid 260 cc postvoid 17 cc images visualized agree w findings \par \par Previously:\par KUB images from 3/2022: No definitive calcifications overlying the expected regions of the kidneys. IUD overlying the pelvis. Nonobstructive bowel gas pattern.\par \par KUB from 12/2021 images visualized: 6-7 mm right lower pole renal calculus.\par \par Previously: \par Urine culture E. coli greater than 100,000\par Urinalysis negative for lab threshold microscopic hematuria\par \par Additionally, she has history of renal stone.\par Renal ultrasound images from 9/22/2021, demonstrates right lower pole stone, 7 x 6 mm. Urinary bladder demonstrates bilateral ureteral jets with a prevoid volume of 246 cc and the postvoid volume of 41 cc. No hydronephrosis bilaterally.\par \par KUB x-ray, from 10/12/2021 demonstrates a 7 mm calcification over the right renal shadow, which may represent calculus. \par \par Previously:\par Renal/bladder ultrasound images demonstrated no hydronephrosis bilaterally with a lower pole 7 x 6 mm focus, possibly nonobstructing stone, in the right lower pole. 9/2021\par \par  PMH: reports at least 1 UTI per year \par Family History: No  malignancies\par Social History: . Vaginal [place]. Consumes 3 coffees per day. Pt is COVID19 vaccinated. \par Hookah smoker. Allergic to Cipro. Daughter Pharmacy major. \par \par UCx: \par 8/26/2021: E.Coli resist to cipro/levaquin\par 7/08/2021: E.Coli \par 6/30/2021: E. Coli \par 5/26/2021 E.Coli  \par

## 2022-10-20 ENCOUNTER — NON-APPOINTMENT (OUTPATIENT)
Age: 42
End: 2022-10-20

## 2022-12-03 ENCOUNTER — INPATIENT (INPATIENT)
Facility: HOSPITAL | Age: 42
LOS: 1 days | Discharge: HOME | End: 2022-12-05
Attending: SURGERY | Admitting: SURGERY

## 2022-12-03 VITALS
HEART RATE: 61 BPM | OXYGEN SATURATION: 99 % | WEIGHT: 173.06 LBS | DIASTOLIC BLOOD PRESSURE: 77 MMHG | TEMPERATURE: 96 F | SYSTOLIC BLOOD PRESSURE: 122 MMHG | RESPIRATION RATE: 16 BRPM

## 2022-12-03 DIAGNOSIS — Z98.891 HISTORY OF UTERINE SCAR FROM PREVIOUS SURGERY: Chronic | ICD-10-CM

## 2022-12-03 LAB
ALBUMIN SERPL ELPH-MCNC: 4.4 G/DL — SIGNIFICANT CHANGE UP (ref 3.5–5.2)
ALP SERPL-CCNC: 156 U/L — HIGH (ref 30–115)
ALT FLD-CCNC: 127 U/L — HIGH (ref 0–41)
ANION GAP SERPL CALC-SCNC: 13 MMOL/L — SIGNIFICANT CHANGE UP (ref 7–14)
APPEARANCE UR: ABNORMAL
AST SERPL-CCNC: 207 U/L — HIGH (ref 0–41)
BACTERIA # UR AUTO: ABNORMAL
BASOPHILS # BLD AUTO: 0.07 K/UL — SIGNIFICANT CHANGE UP (ref 0–0.2)
BASOPHILS NFR BLD AUTO: 0.6 % — SIGNIFICANT CHANGE UP (ref 0–1)
BILIRUB SERPL-MCNC: 1.4 MG/DL — HIGH (ref 0.2–1.2)
BILIRUB UR-MCNC: ABNORMAL
BUN SERPL-MCNC: 9 MG/DL — LOW (ref 10–20)
CALCIUM SERPL-MCNC: 9.3 MG/DL — SIGNIFICANT CHANGE UP (ref 8.4–10.5)
CHLORIDE SERPL-SCNC: 100 MMOL/L — SIGNIFICANT CHANGE UP (ref 98–110)
CO2 SERPL-SCNC: 24 MMOL/L — SIGNIFICANT CHANGE UP (ref 17–32)
COLOR SPEC: YELLOW — SIGNIFICANT CHANGE UP
CREAT SERPL-MCNC: 0.7 MG/DL — SIGNIFICANT CHANGE UP (ref 0.7–1.5)
DIFF PNL FLD: NEGATIVE — SIGNIFICANT CHANGE UP
EGFR: 109 ML/MIN/1.73M2 — SIGNIFICANT CHANGE UP
EOSINOPHIL # BLD AUTO: 0.05 K/UL — SIGNIFICANT CHANGE UP (ref 0–0.7)
EOSINOPHIL NFR BLD AUTO: 0.4 % — SIGNIFICANT CHANGE UP (ref 0–8)
EPI CELLS # UR: 24 /HPF — HIGH (ref 0–5)
GLUCOSE SERPL-MCNC: 118 MG/DL — HIGH (ref 70–99)
GLUCOSE UR QL: NEGATIVE — SIGNIFICANT CHANGE UP
HCT VFR BLD CALC: 42.1 % — SIGNIFICANT CHANGE UP (ref 37–47)
HGB BLD-MCNC: 14.3 G/DL — SIGNIFICANT CHANGE UP (ref 12–16)
HYALINE CASTS # UR AUTO: 5 /LPF — SIGNIFICANT CHANGE UP (ref 0–7)
IMM GRANULOCYTES NFR BLD AUTO: 0.3 % — SIGNIFICANT CHANGE UP (ref 0.1–0.3)
KETONES UR-MCNC: NEGATIVE — SIGNIFICANT CHANGE UP
LACTATE SERPL-SCNC: 1.3 MMOL/L — SIGNIFICANT CHANGE UP (ref 0.7–2)
LEUKOCYTE ESTERASE UR-ACNC: ABNORMAL
LIDOCAIN IGE QN: 28 U/L — SIGNIFICANT CHANGE UP (ref 7–60)
LYMPHOCYTES # BLD AUTO: 0.9 K/UL — LOW (ref 1.2–3.4)
LYMPHOCYTES # BLD AUTO: 7.4 % — LOW (ref 20.5–51.1)
MCHC RBC-ENTMCNC: 28.7 PG — SIGNIFICANT CHANGE UP (ref 27–31)
MCHC RBC-ENTMCNC: 34 G/DL — SIGNIFICANT CHANGE UP (ref 32–37)
MCV RBC AUTO: 84.5 FL — SIGNIFICANT CHANGE UP (ref 81–99)
MONOCYTES # BLD AUTO: 0.49 K/UL — SIGNIFICANT CHANGE UP (ref 0.1–0.6)
MONOCYTES NFR BLD AUTO: 4 % — SIGNIFICANT CHANGE UP (ref 1.7–9.3)
NEUTROPHILS # BLD AUTO: 10.68 K/UL — HIGH (ref 1.4–6.5)
NEUTROPHILS NFR BLD AUTO: 87.3 % — HIGH (ref 42.2–75.2)
NITRITE UR-MCNC: NEGATIVE — SIGNIFICANT CHANGE UP
NRBC # BLD: 0 /100 WBCS — SIGNIFICANT CHANGE UP (ref 0–0)
PH UR: 6.5 — SIGNIFICANT CHANGE UP (ref 5–8)
PLATELET # BLD AUTO: 314 K/UL — SIGNIFICANT CHANGE UP (ref 130–400)
POTASSIUM SERPL-MCNC: 5 MMOL/L — SIGNIFICANT CHANGE UP (ref 3.5–5)
POTASSIUM SERPL-SCNC: 5 MMOL/L — SIGNIFICANT CHANGE UP (ref 3.5–5)
PROT SERPL-MCNC: 7.2 G/DL — SIGNIFICANT CHANGE UP (ref 6–8)
PROT UR-MCNC: ABNORMAL
RBC # BLD: 4.98 M/UL — SIGNIFICANT CHANGE UP (ref 4.2–5.4)
RBC # FLD: 13.2 % — SIGNIFICANT CHANGE UP (ref 11.5–14.5)
RBC CASTS # UR COMP ASSIST: 3 /HPF — SIGNIFICANT CHANGE UP (ref 0–4)
SODIUM SERPL-SCNC: 137 MMOL/L — SIGNIFICANT CHANGE UP (ref 135–146)
SP GR SPEC: 1.03 — SIGNIFICANT CHANGE UP (ref 1.01–1.03)
UROBILINOGEN FLD QL: ABNORMAL
WBC # BLD: 12.23 K/UL — HIGH (ref 4.8–10.8)
WBC # FLD AUTO: 12.23 K/UL — HIGH (ref 4.8–10.8)
WBC UR QL: 7 /HPF — HIGH (ref 0–5)

## 2022-12-03 PROCEDURE — 76705 ECHO EXAM OF ABDOMEN: CPT | Mod: 26

## 2022-12-03 PROCEDURE — 99285 EMERGENCY DEPT VISIT HI MDM: CPT

## 2022-12-03 RX ORDER — MORPHINE SULFATE 50 MG/1
4 CAPSULE, EXTENDED RELEASE ORAL ONCE
Refills: 0 | Status: DISCONTINUED | OUTPATIENT
Start: 2022-12-03 | End: 2022-12-03

## 2022-12-03 RX ORDER — ONDANSETRON 8 MG/1
4 TABLET, FILM COATED ORAL ONCE
Refills: 0 | Status: COMPLETED | OUTPATIENT
Start: 2022-12-03 | End: 2022-12-03

## 2022-12-03 RX ORDER — FAMOTIDINE 10 MG/ML
20 INJECTION INTRAVENOUS ONCE
Refills: 0 | Status: COMPLETED | OUTPATIENT
Start: 2022-12-03 | End: 2022-12-03

## 2022-12-03 RX ORDER — AMPICILLIN SODIUM AND SULBACTAM SODIUM 250; 125 MG/ML; MG/ML
3 INJECTION, POWDER, FOR SUSPENSION INTRAMUSCULAR; INTRAVENOUS ONCE
Refills: 0 | Status: COMPLETED | OUTPATIENT
Start: 2022-12-03 | End: 2022-12-03

## 2022-12-03 RX ADMIN — FAMOTIDINE 20 MILLIGRAM(S): 10 INJECTION INTRAVENOUS at 18:38

## 2022-12-03 RX ADMIN — MORPHINE SULFATE 4 MILLIGRAM(S): 50 CAPSULE, EXTENDED RELEASE ORAL at 23:29

## 2022-12-03 RX ADMIN — ONDANSETRON 4 MILLIGRAM(S): 8 TABLET, FILM COATED ORAL at 18:37

## 2022-12-03 RX ADMIN — AMPICILLIN SODIUM AND SULBACTAM SODIUM 200 GRAM(S): 250; 125 INJECTION, POWDER, FOR SUSPENSION INTRAMUSCULAR; INTRAVENOUS at 21:40

## 2022-12-03 NOTE — CONSULT NOTE ADULT - ASSESSMENT
ASSESSMENT:  41yo female with PMHx of nephrolithiasis s/p lithotripsy presents to the ED due to epigastric pain x 2 days associated with dizziness, nausea and 2 episodes of vomiting today. States that the pain has been constant since onset and worsened today prompting her to report to ED. Denies fevers, loss of appetite or constipation. In the ED, VS WNL, WBC 12, alk phos 156, , , t. bili 1.4. RUQ US obtained appreciating cholelithiasis with sludge and a CBD of 0.8cm. On exam, patient's abdomen is soft, nondistended, +ttp over epigastric region and worse tenderness over mid lower abdomen, no rebound or guarding.     PLAN:  - Pain control  - Keep NPO for now  - Please obtain CT A/P with IV contrast  - Will follow up after completion of imaging    Above plan discussed with Attending Surgeon Dr. Reis, patient, patient family, and Primary team  12-03-22 @ 23:32 ASSESSMENT:  41yo female with PMHx of nephrolithiasis s/p lithotripsy presents to the ED due to epigastric pain x 2 days associated with dizziness, nausea and 2 episodes of vomiting today. States that the pain has been constant since onset and worsened today prompting her to report to ED. Denies fevers, loss of appetite or constipation. In the ED, VS WNL, WBC 12, alk phos 156, , , T. bili 1.4. RUQ US obtained appreciating cholelithiasis with sludge and a CBD of 0.8cm. On exam, patient's abdomen is soft, nondistended, +ttp over epigastric region and worse tenderness over mid lower abdomen, no rebound or guarding.     PLAN:  - Pain control  - Keep NPO for now  - Please obtain CT A/P with IV contrast  - Will follow up after completion of imaging    Above plan discussed with Attending Surgeon Dr. Reis, patient, patient family, and Primary team  12-03-22 @ 23:32

## 2022-12-04 ENCOUNTER — RESULT REVIEW (OUTPATIENT)
Age: 42
End: 2022-12-04

## 2022-12-04 ENCOUNTER — TRANSCRIPTION ENCOUNTER (OUTPATIENT)
Age: 42
End: 2022-12-04

## 2022-12-04 LAB
ALBUMIN SERPL ELPH-MCNC: 4.6 G/DL — SIGNIFICANT CHANGE UP (ref 3.5–5.2)
ALP SERPL-CCNC: 186 U/L — HIGH (ref 30–115)
ALT FLD-CCNC: 250 U/L — HIGH (ref 0–41)
ANION GAP SERPL CALC-SCNC: 12 MMOL/L — SIGNIFICANT CHANGE UP (ref 7–14)
APTT BLD: 30.9 SEC — SIGNIFICANT CHANGE UP (ref 27–39.2)
AST SERPL-CCNC: 197 U/L — HIGH (ref 0–41)
BASOPHILS # BLD AUTO: 0.06 K/UL — SIGNIFICANT CHANGE UP (ref 0–0.2)
BASOPHILS NFR BLD AUTO: 0.8 % — SIGNIFICANT CHANGE UP (ref 0–1)
BILIRUB DIRECT SERPL-MCNC: 0.6 MG/DL — HIGH (ref 0–0.3)
BILIRUB INDIRECT FLD-MCNC: 1.3 MG/DL — HIGH (ref 0.2–1.2)
BILIRUB SERPL-MCNC: 1.9 MG/DL — HIGH (ref 0.2–1.2)
BLD GP AB SCN SERPL QL: SIGNIFICANT CHANGE UP
BUN SERPL-MCNC: 8 MG/DL — LOW (ref 10–20)
CALCIUM SERPL-MCNC: 9.3 MG/DL — SIGNIFICANT CHANGE UP (ref 8.4–10.5)
CHLORIDE SERPL-SCNC: 102 MMOL/L — SIGNIFICANT CHANGE UP (ref 98–110)
CO2 SERPL-SCNC: 25 MMOL/L — SIGNIFICANT CHANGE UP (ref 17–32)
CREAT SERPL-MCNC: 0.7 MG/DL — SIGNIFICANT CHANGE UP (ref 0.7–1.5)
EGFR: 109 ML/MIN/1.73M2 — SIGNIFICANT CHANGE UP
EOSINOPHIL # BLD AUTO: 0.16 K/UL — SIGNIFICANT CHANGE UP (ref 0–0.7)
EOSINOPHIL NFR BLD AUTO: 2.1 % — SIGNIFICANT CHANGE UP (ref 0–8)
GLUCOSE SERPL-MCNC: 85 MG/DL — SIGNIFICANT CHANGE UP (ref 70–99)
HCT VFR BLD CALC: 43.8 % — SIGNIFICANT CHANGE UP (ref 37–47)
HGB BLD-MCNC: 14.3 G/DL — SIGNIFICANT CHANGE UP (ref 12–16)
IMM GRANULOCYTES NFR BLD AUTO: 0.3 % — SIGNIFICANT CHANGE UP (ref 0.1–0.3)
INR BLD: 1.05 RATIO — SIGNIFICANT CHANGE UP (ref 0.65–1.3)
LYMPHOCYTES # BLD AUTO: 1.29 K/UL — SIGNIFICANT CHANGE UP (ref 1.2–3.4)
LYMPHOCYTES # BLD AUTO: 16.6 % — LOW (ref 20.5–51.1)
MAGNESIUM SERPL-MCNC: 2.1 MG/DL — SIGNIFICANT CHANGE UP (ref 1.8–2.4)
MCHC RBC-ENTMCNC: 28.3 PG — SIGNIFICANT CHANGE UP (ref 27–31)
MCHC RBC-ENTMCNC: 32.6 G/DL — SIGNIFICANT CHANGE UP (ref 32–37)
MCV RBC AUTO: 86.6 FL — SIGNIFICANT CHANGE UP (ref 81–99)
MONOCYTES # BLD AUTO: 0.49 K/UL — SIGNIFICANT CHANGE UP (ref 0.1–0.6)
MONOCYTES NFR BLD AUTO: 6.3 % — SIGNIFICANT CHANGE UP (ref 1.7–9.3)
NEUTROPHILS # BLD AUTO: 5.77 K/UL — SIGNIFICANT CHANGE UP (ref 1.4–6.5)
NEUTROPHILS NFR BLD AUTO: 73.9 % — SIGNIFICANT CHANGE UP (ref 42.2–75.2)
NRBC # BLD: 0 /100 WBCS — SIGNIFICANT CHANGE UP (ref 0–0)
PHOSPHATE SERPL-MCNC: 3.6 MG/DL — SIGNIFICANT CHANGE UP (ref 2.1–4.9)
PLATELET # BLD AUTO: 297 K/UL — SIGNIFICANT CHANGE UP (ref 130–400)
POTASSIUM SERPL-MCNC: 4.2 MMOL/L — SIGNIFICANT CHANGE UP (ref 3.5–5)
POTASSIUM SERPL-SCNC: 4.2 MMOL/L — SIGNIFICANT CHANGE UP (ref 3.5–5)
PROT SERPL-MCNC: 7.1 G/DL — SIGNIFICANT CHANGE UP (ref 6–8)
PROTHROM AB SERPL-ACNC: 12 SEC — SIGNIFICANT CHANGE UP (ref 9.95–12.87)
RBC # BLD: 5.06 M/UL — SIGNIFICANT CHANGE UP (ref 4.2–5.4)
RBC # FLD: 13.5 % — SIGNIFICANT CHANGE UP (ref 11.5–14.5)
SARS-COV-2 RNA SPEC QL NAA+PROBE: SIGNIFICANT CHANGE UP
SODIUM SERPL-SCNC: 139 MMOL/L — SIGNIFICANT CHANGE UP (ref 135–146)
WBC # BLD: 7.79 K/UL — SIGNIFICANT CHANGE UP (ref 4.8–10.8)
WBC # FLD AUTO: 7.79 K/UL — SIGNIFICANT CHANGE UP (ref 4.8–10.8)

## 2022-12-04 PROCEDURE — 74177 CT ABD & PELVIS W/CONTRAST: CPT | Mod: 26,MA

## 2022-12-04 PROCEDURE — 99223 1ST HOSP IP/OBS HIGH 75: CPT

## 2022-12-04 PROCEDURE — 74181 MRI ABDOMEN W/O CONTRAST: CPT | Mod: 26

## 2022-12-04 PROCEDURE — 88304 TISSUE EXAM BY PATHOLOGIST: CPT | Mod: 26

## 2022-12-04 RX ORDER — METOCLOPRAMIDE HCL 10 MG
10 TABLET ORAL ONCE
Refills: 0 | Status: DISCONTINUED | OUTPATIENT
Start: 2022-12-04 | End: 2022-12-04

## 2022-12-04 RX ORDER — CEFTRIAXONE 500 MG/1
1000 INJECTION, POWDER, FOR SOLUTION INTRAMUSCULAR; INTRAVENOUS EVERY 24 HOURS
Refills: 0 | Status: DISCONTINUED | OUTPATIENT
Start: 2022-12-04 | End: 2022-12-04

## 2022-12-04 RX ORDER — OXYCODONE HYDROCHLORIDE 5 MG/1
5 TABLET ORAL EVERY 6 HOURS
Refills: 0 | Status: DISCONTINUED | OUTPATIENT
Start: 2022-12-04 | End: 2022-12-05

## 2022-12-04 RX ORDER — ENOXAPARIN SODIUM 100 MG/ML
40 INJECTION SUBCUTANEOUS EVERY 24 HOURS
Refills: 0 | Status: DISCONTINUED | OUTPATIENT
Start: 2022-12-04 | End: 2022-12-04

## 2022-12-04 RX ORDER — ACETAMINOPHEN 500 MG
650 TABLET ORAL EVERY 6 HOURS
Refills: 0 | Status: DISCONTINUED | OUTPATIENT
Start: 2022-12-04 | End: 2022-12-04

## 2022-12-04 RX ORDER — SODIUM CHLORIDE 9 MG/ML
1000 INJECTION, SOLUTION INTRAVENOUS
Refills: 0 | Status: DISCONTINUED | OUTPATIENT
Start: 2022-12-04 | End: 2022-12-04

## 2022-12-04 RX ORDER — ENOXAPARIN SODIUM 100 MG/ML
40 INJECTION SUBCUTANEOUS EVERY 24 HOURS
Refills: 0 | Status: DISCONTINUED | OUTPATIENT
Start: 2022-12-04 | End: 2022-12-05

## 2022-12-04 RX ORDER — CEFTRIAXONE 500 MG/1
1000 INJECTION, POWDER, FOR SOLUTION INTRAMUSCULAR; INTRAVENOUS EVERY 24 HOURS
Refills: 0 | Status: DISCONTINUED | OUTPATIENT
Start: 2022-12-04 | End: 2022-12-05

## 2022-12-04 RX ORDER — OXYCODONE HYDROCHLORIDE 5 MG/1
5 TABLET ORAL EVERY 6 HOURS
Refills: 0 | Status: DISCONTINUED | OUTPATIENT
Start: 2022-12-04 | End: 2022-12-04

## 2022-12-04 RX ORDER — HYDROMORPHONE HYDROCHLORIDE 2 MG/ML
0.5 INJECTION INTRAMUSCULAR; INTRAVENOUS; SUBCUTANEOUS
Refills: 0 | Status: DISCONTINUED | OUTPATIENT
Start: 2022-12-04 | End: 2022-12-04

## 2022-12-04 RX ORDER — PANTOPRAZOLE SODIUM 20 MG/1
40 TABLET, DELAYED RELEASE ORAL
Refills: 0 | Status: DISCONTINUED | OUTPATIENT
Start: 2022-12-04 | End: 2022-12-04

## 2022-12-04 RX ORDER — KETOROLAC TROMETHAMINE 30 MG/ML
15 SYRINGE (ML) INJECTION EVERY 8 HOURS
Refills: 0 | Status: DISCONTINUED | OUTPATIENT
Start: 2022-12-04 | End: 2022-12-04

## 2022-12-04 RX ORDER — CHLORHEXIDINE GLUCONATE 213 G/1000ML
1 SOLUTION TOPICAL
Refills: 0 | Status: DISCONTINUED | OUTPATIENT
Start: 2022-12-04 | End: 2022-12-04

## 2022-12-04 RX ORDER — IBUPROFEN 200 MG
600 TABLET ORAL EVERY 6 HOURS
Refills: 0 | Status: DISCONTINUED | OUTPATIENT
Start: 2022-12-04 | End: 2022-12-05

## 2022-12-04 RX ORDER — ACETAMINOPHEN 500 MG
650 TABLET ORAL EVERY 6 HOURS
Refills: 0 | Status: DISCONTINUED | OUTPATIENT
Start: 2022-12-04 | End: 2022-12-05

## 2022-12-04 RX ORDER — PANTOPRAZOLE SODIUM 20 MG/1
40 TABLET, DELAYED RELEASE ORAL
Refills: 0 | Status: DISCONTINUED | OUTPATIENT
Start: 2022-12-04 | End: 2022-12-05

## 2022-12-04 RX ADMIN — Medication 650 MILLIGRAM(S): at 18:22

## 2022-12-04 RX ADMIN — SODIUM CHLORIDE 100 MILLILITER(S): 9 INJECTION, SOLUTION INTRAVENOUS at 21:35

## 2022-12-04 RX ADMIN — CEFTRIAXONE 100 MILLIGRAM(S): 500 INJECTION, POWDER, FOR SOLUTION INTRAMUSCULAR; INTRAVENOUS at 06:07

## 2022-12-04 RX ADMIN — OXYCODONE HYDROCHLORIDE 5 MILLIGRAM(S): 5 TABLET ORAL at 12:24

## 2022-12-04 RX ADMIN — Medication 650 MILLIGRAM(S): at 06:08

## 2022-12-04 RX ADMIN — SODIUM CHLORIDE 120 MILLILITER(S): 9 INJECTION, SOLUTION INTRAVENOUS at 06:07

## 2022-12-04 RX ADMIN — Medication 650 MILLIGRAM(S): at 23:48

## 2022-12-04 NOTE — H&P ADULT - NSHPLABSRESULTS_GEN_ALL_CORE
LABS:                         14.3   . )-----------( 314      ( 03 Dec 2022 18:37 )             42.1         137  |  100  |  9<L>  ----------------------------<  118<H>  5.0   |  24  |  0.7    Ca    9.3      03 Dec 2022 18:37    TPro  7.2  /  Alb  4.4  /  TBili  1.4<H>  /  DBili  x   /  AST  207<H>  /  ALT  127<H>  /  AlkPhos  156<H>        Urinalysis Basic - ( 03 Dec 2022 18:37 )    Color: Yellow / Appearance: Slightly Turbid / S.028 / pH: x  Gluc: x / Ketone: Negative  / Bili: Small / Urobili: 6 mg/dL   Blood: x / Protein: 100 mg/dL / Nitrite: Negative   Leuk Esterase: Large / RBC: 3 /HPF / WBC 7 /HPF   Sq Epi: x / Non Sq Epi: 24 /HPF / Bacteria: Few      Lactate, Blood: 1.3 mmol/L ( @ 18:37)      RADIOLOGY, EKG & ADDITIONAL TESTS:   ------------------------------------------------------------------------    < from: CT Abdomen and Pelvis w/ IV Cont (22 @ 03:14) >    Left upper quadrant small bowel dilatation, liquid stool, and mild   inflammatory changes may represent enteritis.  Otherwise, no evidence ofacute abdominal or pelvic pathology.    < from: US Abdomen Upper Quadrant Right (22 @ 20:36) >    Cholelithiasis, gallbladder sludge, without sonographic evidence of   cholecystitis.  Mild dilation of common bile duct, 0.8 cm; correlation with LFTs   suggested.  Increased liver parenchymal echogenicity, which may represent hepatic  steatosis.

## 2022-12-04 NOTE — ED PROVIDER NOTE - PHYSICAL EXAMINATION
VITAL SIGNS: I have reviewed nursing notes and confirm.  CONSTITUTIONAL: Well-developed; well-nourished; in mild painful acute distress.  SKIN: Skin exam is warm and dry, no acute rash.  HEAD: Normocephalic; atraumatic.  EYES: PERRL, EOM intact; conjunctiva and sclera clear.  ENT: No nasal discharge; airway clear. TMs clear.  NECK: Supple; non tender.  CARD: S1, S2 normal; no murmurs, gallops, or rubs. Regular rate and rhythm.  RESP: No wheezes, rales or rhonchi.  ABD: Normal bowel sounds; soft; non-distended; RUQ tenderness; no hepatosplenomegaly.  EXT: Normal ROM. No clubbing, cyanosis or edema.  LYMPH: No acute cervical adenopathy.  NEURO: Alert, oriented. Grossly unremarkable. No focal deficits.  PSYCH: Cooperative, appropriate.

## 2022-12-04 NOTE — ED PROVIDER NOTE - CLINICAL SUMMARY MEDICAL DECISION MAKING FREE TEXT BOX
41 yo woman with RUQ pain for the past few days worse with eating without jaundice or fever.  Workup reveals likely cholecystitis vs. choledocholithiasis.  Surgery consulted and requested CT scan for some reason.  IV antibiotics, analgesia and admission to surgery.  Patient and  made aware of plan.

## 2022-12-04 NOTE — BRIEF OPERATIVE NOTE - OPERATION/FINDINGS
Laparoscopic cholecystectomy for acute cholecystitis (non-perforated, non-gangrenous). Cystic duct very dilated; artery and duct identified and clipped.

## 2022-12-04 NOTE — H&P ADULT - HISTORY OF PRESENT ILLNESS
41yo female with PMHx of nephrolithiasis s/p lithotripsy presents to the ED due to epigastric pain x 2 days associated with dizziness, nausea and 2 episodes of vomiting today. States that the pain has been constant since onset and worsened today prompting her to report to ED. Denies fevers, loss of appetite or constipation. In the ED, VS WNL, WBC 12, alk phos 156, , , t. bili 1.4. RUQ US obtained appreciating cholelithiasis with sludge and a CBD of 0.8cm. On exam, patient's abdomen is soft, nondistended, +ttp over epigastric region and worse tenderness over mid lower abdomen, no rebound or guarding.

## 2022-12-04 NOTE — CONSULT NOTE ADULT - SUBJECTIVE AND OBJECTIVE BOX
Gastroenterology Consultation:    Patient is a 42y old  Female who presents with a chief complaint of       Admitted on: 22      HPI:    43yo female with PMHx of biliary colic, nephrolithiasis s/p lithotripsy presents to the ED due to epigastric pain x 2 days associated with dizziness, nausea and 2 episodes of vomiting today. States that the pain has been constant since onset and worsened today prompting her to report to ED. Denies fevers, loss of appetite or constipation. In the ED, VS WNL, WBC 12, alk phos 156, , , t. bili 1.4. RUQ US obtained appreciating cholelithiasis with sludge and a CBD of 0.8cm. GI was consulted for evaluation of choledocholithiasis. Denies N/V/D, fever, chills acholic stools, pruritus or dark urine          PAST MEDICAL & SURGICAL HISTORY:  Kidney stone      H/O  section  X 1            FAMILY HISTORY:  No pertinent family history in first degree relatives        Social History:  Tobacco: denies   Alcohol: denies   Drugs: denies    Home Medications:        MEDICATIONS  (STANDING):  acetaminophen     Tablet .. 650 milliGRAM(s) Oral every 6 hours  cefTRIAXone   IVPB 1000 milliGRAM(s) IV Intermittent every 24 hours  chlorhexidine 4% Liquid 1 Application(s) Topical <User Schedule>  enoxaparin Injectable 40 milliGRAM(s) SubCutaneous every 24 hours  lactated ringers. 1000 milliLiter(s) (120 mL/Hr) IV Continuous <Continuous>  pantoprazole    Tablet 40 milliGRAM(s) Oral before breakfast    MEDICATIONS  (PRN):  ketorolac   Injectable 15 milliGRAM(s) IV Push every 8 hours PRN Moderate Pain (4 - 6)  oxyCODONE    IR 5 milliGRAM(s) Oral every 6 hours PRN Severe Pain (7 - 10)      Allergies  Cipro (Other)      Review of Systems:   Constitutional:  No Fever, No Chills  ENT/Mouth:  No Hearing Changes,  No Difficulty Swallowing  Eyes:  No Eye Pain, No Vision Changes  Cardiovascular:  No Chest Pain, No Palpitations  Respiratory:  No Cough, No Dyspnea  Gastrointestinal:  As described in HPI          Physical Examination:  T(C): 36.1 (22 @ 07:56), Max: 36.4 (22 @ 00:48)  HR: 64 (22 @ 07:56) (54 - 64)  BP: 119/74 (22 @ 07:56) (113/69 - 122/77)  RR: 16 (22 @ 07:56) (16 - 18)  SpO2: 99% (22 @ 07:56) (98% - 99%)    Weight (kg): 78.5 (22 @ 17:20)        GENERAL: AAOx3, no acute distress.  HEAD:  Atraumatic, Normocephalic  EYES: conjunctiva and sclera clear  CHEST/LUNG: Clear to auscultation bilaterally; No wheeze, rhonchi, or rales  HEART: Regular rate and rhythm; normal S1, S2, No murmurs.  ABDOMEN: Soft, RUQ tenderness, Hoskins sign+, nondistended; Bowel sounds present  SKIN: Intact, no jaundice        Data:                        14.3   7.79  )-----------( 297      ( 04 Dec 2022 11:11 )             43.8     Hgb Trend:  14.3  22 @ 11:11  14.3  22 @ 18:37      12    137  |  100  |  9<L>  ----------------------------<  118<H>  5.0   |  24  |  0.7    Ca    9.3      03 Dec 2022 18:37    TPro  7.2  /  Alb  4.4  /  TBili  1.4<H>  /  DBili  x   /  AST  207<H>  /  ALT  127<H>  /  AlkPhos  156<H>      Liver panel trend:  TBili 1.4   /      /      /   AlkP 156   /   Tptn 7.2   /   Alb 4.4    /   DBili --            PT/INR - ( 04 Dec 2022 11:11 )   PT: 12.00 sec;   INR: 1.05 ratio         PTT - ( 04 Dec 2022 11:11 )  PTT:30.9 sec        Radiology:  CT Abdomen and Pelvis w/ IV Cont:   ACC: [id number] EXAM:  CT ABDOMEN AND PELVIS IC                          PROCEDURE DATE:  2022          INTERPRETATION:  CLINICAL HISTORY/REASON FOR EXAM: Abdominal pain.    TECHNIQUE: Contiguous axial CT images of the abdomen and pelvis were   obtained following the administration of 100 cc Omnipaque 350 intravenous   contrast. Oral contrast was not administered. Reformatted images in the   coronal and sagittal planes were acquired.    COMPARISON: None      FINDINGS:  LOWER CHEST: There ismild bibasilar subsegmental atelectasis.    HEPATOBILIARY: The liver is normal in appearance. Cholelithiasis. No   evidence of intra or extrahepatic biliary dilatation.    SPLEEN: Unremarkable.    PANCREAS: Unremarkable.    ADRENAL GLANDS: Unremarkable.    KIDNEYS: Symmetric pattern of renal enhancement. No evidence of a mass,   hydronephrosis or hydroureter.    ABDOMINOPELVIC NODES: No enlarged abdominal or pelvic lymph nodes.    PELVIC ORGANS: Intrauterine device in place. Bilateral adnexal cysts.    PERITONEUM/MESENTERY/BOWEL: Focal left upper quadrant small bowel   prominence, liquid stool, and mild inflammatory changes (3/196). No bowel   obstruction, ascites or intraperitoneal free air. The appendix is normal   in caliber.    BONES/SOFTTISSUES: No acute osseous abnormality.    VASCULAR:  The aorta is normal in caliber.      IMPRESSION:      Left upper quadrant small bowel dilatation, liquid stool, and mild   inflammatory changes may represent enteritis.    Otherwise, no evidence ofacute abdominal or pelvic pathology.    --- End of Report ---          ERIK POWELL MD; Resident Radiologist  This document has been electronically signed.  LUH KRAMER MD; Attending Radiologist  This document has been electronically signed. Dec  4 2022  4:39AM (22 @ 03:14)    US Abdomen Upper Quadrant Right:   ACC: [id number] EXAM:  US ABDOMEN RT UPR QUADRANT                          PROCEDURE DATE:  2022          INTERPRETATION:  CLINICAL INFORMATION: Cholelithiasis.    COMPARISON: None available.    TECHNIQUE: Sonography of the right upper quadrant.    FINDINGS:  Liver: Increased liver parenchymal echogenicity, which may represent   hepatic steatosis.  Bile ducts: Mild dilation of common bile duct, 0.8 cm; correlation with   LFTs suggested.  Gallbladder: Cholelithiasis, gallbladder sludge. No abnormal gallbladder   wall thickening. Negative reported sonographic Hoskins's sign.  Pancreas: Visualized portions are within normal limits.  Right kidney: 11.5 cm. No hydronephrosis.  Ascites: None.  IVC: Visualized portions are within normal limits.    IMPRESSION:  Cholelithiasis, gallbladder sludge, without sonographic evidence of   cholecystitis.  Mild dilation of common bile duct, 0.8 cm; correlation with LFTs   suggested.  Increased liver parenchymal echogenicity, which may represent hepatic  steatosis.    --- End of Report ---            KENNEDY LAIRD MD; Attending Radiologist  This document has been electronically signed. Dec  3 2022  8:38PM (22 @ 20:36)    
GENERAL SURGERY CONSULT NOTE    Patient: MARLEY FRIEDMAN , 42y (76)Female   MRN: [id number]  Location: [facility] ED  Visit: 22 Emergency  Date: 22 @ 23:32    HPI: 41yo female with PMHx of nephrolithiasis s/p lithotripsy presents to the ED due to epigastric pain x 2 days associated with dizziness, nausea and 2 episodes of vomiting today. States that the pain has been constant since onset and worsened today prompting her to report to ED. Denies fevers, loss of appetite or constipation. In the ED, VS WNL, WBC 12, alk phos 156, , , t. bili 1.4. RUQ US obtained appreciating cholelithiasis with sludge and a CBD of 0.8cm. On exam, patient's abdomen is soft, nondistended, +ttp over epigastric region and worse tenderness over mid lower abdomen, no rebound or guarding.     PAST MEDICAL & SURGICAL HISTORY:  Kidney stone  H/O  section X 1    VITALS:  T(F): 96.2 (22 @ 17:20), Max: 96.2 (22 @ 17:20)  HR: 61 (22 @ 17:20) (61 - 61)  BP: 122/77 (22 @ 17:20) (122/77 - 122/77)  RR: 16 (22 @ 17:20) (16 - 16)  SpO2: 99% (22 @ 17:20) (99% - 99%)    PHYSICAL EXAM:  General: NAD, AAOx3, calm and cooperative  Cardiac: S1, S2 present  Respiratory: No labored breathing, normal respiratory effort on RA  Abdomen: Soft, non-distended, +ttp over epigastric and mid lower abdomen, no rebound, no guarding  Skin: Warm/dry, normal color, no jaundice    MEDICATIONS  (STANDING):  MEDICATIONS  (PRN):    LAB/STUDIES:                        14.3    )-----------( 314      ( 03 Dec 2022 18:37 )             42.1         137  |  100  |  9<L>  ----------------------------<  118<H>  5.0   |  24  |  0.7    Ca    9.3      03 Dec 2022 18:37    TPro  7.2  /  Alb  4.4  /  TBili  1.4<H>  /  DBili  x   /  AST  207<H>  /  ALT  127<H>  /  AlkPhos  156<H>  12-03      LIVER FUNCTIONS - ( 03 Dec 2022 18:37 )  Alb: 4.4 g/dL / Pro: 7.2 g/dL / ALK PHOS: 156 U/L / ALT: 127 U/L / AST: 207 U/L / GGT: x           Urinalysis Basic - ( 03 Dec 2022 18:37 )    Color: Yellow / Appearance: Slightly Turbid / S.028 / pH: x  Gluc: x / Ketone: Negative  / Bili: Small / Urobili: 6 mg/dL   Blood: x / Protein: 100 mg/dL / Nitrite: Negative   Leuk Esterase: Large / RBC: 3 /HPF / WBC 7 /HPF   Sq Epi: x / Non Sq Epi: 24 /HPF / Bacteria: Few      IMAGING:    < from: US Abdomen Upper Quadrant Right (22 @ 20:36) >  Cholelithiasis, gallbladder sludge, without sonographic evidence of   cholecystitis.  Mild dilation of common bile duct, 0.8 cm; correlation with LFTs   suggested.  Increased liver parenchymal echogenicity, which may represent hepatic  steatosis.

## 2022-12-04 NOTE — H&P ADULT - ASSESSMENT
ASSESSMENT: 41yo female with PMHx of nephrolithiasis s/p lithotripsy presents to the ED due to epigastric pain x 2 days associated with dizziness, nausea and 2 episodes of vomiting today.  In the ED, VS WNL, WBC 12, alk phos 156, , , T. bili 1.4. RUQ US obtained appreciating cholelithiasis with sludge and a CBD of 0.8cm. On exam, patient's abdomen is soft, nondistended,    PLAN:  - Admit to surgery under Dr. Tapia  - Pain control  - NPO, IVF  - IV Rocephin  - DVT/GI ppx  - Advanced GI consult  - F/u 11am labs  - IS encouraged  - Ambulate as tolerated

## 2022-12-04 NOTE — PATIENT PROFILE ADULT - FALL HARM RISK - HARM RISK INTERVENTIONS
Assistance with ambulation/Communicate Risk of Fall with Harm to all staff/Monitor for mental status changes/Monitor gait and stability/Reinforce activity limits and safety measures with patient and family/Tailored Fall Risk Interventions/Visual Cue: Yellow wristband and red socks/Bed in lowest position, wheels locked, appropriate side rails in place/Call bell, personal items and telephone in reach/Instruct patient to call for assistance before getting out of bed or chair/Non-slip footwear when patient is out of bed/[place] to call system/Physically safe environment - no spills, clutter or unnecessary equipment/Purposeful Proactive Rounding/Room/bathroom lighting operational, light cord in reach

## 2022-12-04 NOTE — CONSULT NOTE ADULT - ASSESSMENT
43yo female with PMHx of biliary colic, nephrolithiasis s/p lithotripsy presents to the ED due to epigastric pain x 2 days associated with dizziness, nausea and 2 episodes of vomiting today. States that the pain has been constant since onset and worsened today prompting her to report to ED. Denies fevers, loss of appetite or constipation. In the ED, VS WNL, WBC 12, alk phos 156, , , t. bili 1.4. RUQ US obtained appreciating cholelithiasis with sludge and a CBD of 0.8cm. GI was consulted for evaluation of choledocholithiasis. Denies N/V/D, fever, chills acholic stools, pruritus or dark urine    #Acute cholecystitis - r/o choledocho  - PE: +RUQ tenderness and pacheco sign positive. and elevated WBC  - RUQ sono: +GS sludge and mild wall thickening. CBD 8mm  - LFTSx1 - 1.4/156/207/125    Plan  - NPO  - IVF  - Abx  - f/u MRCP  - if evidence of choledocho will plan for EUS/ERCP

## 2022-12-04 NOTE — ED PROVIDER NOTE - NS ED ATTENDING STATEMENT MOD
This was a shared visit with the FLORENCIO. I reviewed and verified the documentation and independently performed the documented:

## 2022-12-04 NOTE — CHART NOTE - NSCHARTNOTEFT_GEN_A_CORE
PACU ANESTHESIA ADMISSION NOTE      Procedure: Laparoscopic cholecystectomy      Post op diagnosis:  Acute calculous cholecystitis        ____  Intubated  TV:______       Rate: ______      FiO2: ______    __x__  Patent Airway    __x__  Full return of protective reflexes    __x__  Full recovery from anesthesia / back to baseline status    Vitals:  T(C): 36.8 (12-04-22 @ 19:59), Max: 36.8 (12-04-22 @ 18:35)  HR: 56 (12-04-22 @ 19:59) (52 - 64)  BP: 112/72 (12-04-22 @ 19:59) (109/60 - 119/74)  RR: 17 (12-04-22 @ 19:59) (16 - 18)  SpO2: 99% (12-04-22 @ 19:59) (98% - 99%)    Mental Status:  __x__ Awake   ___x__ Alert   _____ Drowsy   _____ Sedated    Nausea/Vomiting:  __x__ NO  ______Yes,   See Post - Op Orders          Pain Scale (0-10):  __0___    Treatment: ____ None    __x__ See Post - Op/PCA Orders    Post - Operative Fluids:   ____ Oral   __x__ See Post - Op Orders    Plan: Discharge:   ____Home       _x____Floor     _____Critical Care    _____  Other:_________________    Comments: Patient had smooth intraoperative event, no anesthesia complication.  PACU Vital signs: HR:  83          BP:    133    /  73        RR:  17           O2 Sat: 98      %     Temp 97.6

## 2022-12-04 NOTE — ED PROVIDER NOTE - OBJECTIVE STATEMENT
42 year old female with no pmhx presents with epigastric and right upper abdominal pain x few days. Pain worse after eating. Pt took pepcid and omeprazole over the counter with no relief. Pt admits to episodes of vomiting today. pt denies fever, chills, diarrhea, urinary symptoms, chest pain or sob

## 2022-12-04 NOTE — H&P ADULT - NSHPPHYSICALEXAM_GEN_ALL_CORE
General: NAD, AAOx3, calm and cooperative  Cardiac: S1, S2 present  Respiratory: No labored breathing, normal respiratory effort on RA  Abdomen: Soft, non-distended, +ttp over epigastric and mid lower abdomen, no rebound, no guarding  Skin: Warm/dry, normal color, no jaundice

## 2022-12-05 ENCOUNTER — TRANSCRIPTION ENCOUNTER (OUTPATIENT)
Age: 42
End: 2022-12-05

## 2022-12-05 VITALS
RESPIRATION RATE: 18 BRPM | HEART RATE: 57 BPM | DIASTOLIC BLOOD PRESSURE: 80 MMHG | TEMPERATURE: 99 F | OXYGEN SATURATION: 96 % | SYSTOLIC BLOOD PRESSURE: 131 MMHG

## 2022-12-05 LAB
ALBUMIN SERPL ELPH-MCNC: 3.9 G/DL — SIGNIFICANT CHANGE UP (ref 3.5–5.2)
ALP SERPL-CCNC: 202 U/L — HIGH (ref 30–115)
ALT FLD-CCNC: 209 U/L — HIGH (ref 0–41)
ANION GAP SERPL CALC-SCNC: 9 MMOL/L — SIGNIFICANT CHANGE UP (ref 7–14)
AST SERPL-CCNC: 163 U/L — HIGH (ref 0–41)
BASOPHILS # BLD AUTO: 0.02 K/UL — SIGNIFICANT CHANGE UP (ref 0–0.2)
BASOPHILS NFR BLD AUTO: 0.2 % — SIGNIFICANT CHANGE UP (ref 0–1)
BILIRUB DIRECT SERPL-MCNC: 1 MG/DL — HIGH (ref 0–0.3)
BILIRUB INDIRECT FLD-MCNC: 1.1 MG/DL — SIGNIFICANT CHANGE UP (ref 0.2–1.2)
BILIRUB SERPL-MCNC: 2.1 MG/DL — HIGH (ref 0.2–1.2)
BUN SERPL-MCNC: 8 MG/DL — LOW (ref 10–20)
CALCIUM SERPL-MCNC: 8.9 MG/DL — SIGNIFICANT CHANGE UP (ref 8.4–10.5)
CHLORIDE SERPL-SCNC: 99 MMOL/L — SIGNIFICANT CHANGE UP (ref 98–110)
CO2 SERPL-SCNC: 24 MMOL/L — SIGNIFICANT CHANGE UP (ref 17–32)
CREAT SERPL-MCNC: 0.6 MG/DL — LOW (ref 0.7–1.5)
EGFR: 113 ML/MIN/1.73M2 — SIGNIFICANT CHANGE UP
EOSINOPHIL # BLD AUTO: 0 K/UL — SIGNIFICANT CHANGE UP (ref 0–0.7)
EOSINOPHIL NFR BLD AUTO: 0 % — SIGNIFICANT CHANGE UP (ref 0–8)
GLUCOSE SERPL-MCNC: 121 MG/DL — HIGH (ref 70–99)
HCT VFR BLD CALC: 39.6 % — SIGNIFICANT CHANGE UP (ref 37–47)
HGB BLD-MCNC: 13.1 G/DL — SIGNIFICANT CHANGE UP (ref 12–16)
IMM GRANULOCYTES NFR BLD AUTO: 0.2 % — SIGNIFICANT CHANGE UP (ref 0.1–0.3)
LYMPHOCYTES # BLD AUTO: 0.82 K/UL — LOW (ref 1.2–3.4)
LYMPHOCYTES # BLD AUTO: 8.8 % — LOW (ref 20.5–51.1)
MAGNESIUM SERPL-MCNC: 1.8 MG/DL — SIGNIFICANT CHANGE UP (ref 1.8–2.4)
MCHC RBC-ENTMCNC: 28.5 PG — SIGNIFICANT CHANGE UP (ref 27–31)
MCHC RBC-ENTMCNC: 33.1 G/DL — SIGNIFICANT CHANGE UP (ref 32–37)
MCV RBC AUTO: 86.3 FL — SIGNIFICANT CHANGE UP (ref 81–99)
MONOCYTES # BLD AUTO: 0.29 K/UL — SIGNIFICANT CHANGE UP (ref 0.1–0.6)
MONOCYTES NFR BLD AUTO: 3.1 % — SIGNIFICANT CHANGE UP (ref 1.7–9.3)
NEUTROPHILS # BLD AUTO: 8.19 K/UL — HIGH (ref 1.4–6.5)
NEUTROPHILS NFR BLD AUTO: 87.7 % — HIGH (ref 42.2–75.2)
NRBC # BLD: 0 /100 WBCS — SIGNIFICANT CHANGE UP (ref 0–0)
PHOSPHATE SERPL-MCNC: 4.8 MG/DL — SIGNIFICANT CHANGE UP (ref 2.1–4.9)
PLATELET # BLD AUTO: 296 K/UL — SIGNIFICANT CHANGE UP (ref 130–400)
POTASSIUM SERPL-MCNC: 4.4 MMOL/L — SIGNIFICANT CHANGE UP (ref 3.5–5)
POTASSIUM SERPL-SCNC: 4.4 MMOL/L — SIGNIFICANT CHANGE UP (ref 3.5–5)
PROT SERPL-MCNC: 6.3 G/DL — SIGNIFICANT CHANGE UP (ref 6–8)
RBC # BLD: 4.59 M/UL — SIGNIFICANT CHANGE UP (ref 4.2–5.4)
RBC # FLD: 13.4 % — SIGNIFICANT CHANGE UP (ref 11.5–14.5)
SODIUM SERPL-SCNC: 132 MMOL/L — LOW (ref 135–146)
WBC # BLD: 9.34 K/UL — SIGNIFICANT CHANGE UP (ref 4.8–10.8)
WBC # FLD AUTO: 9.34 K/UL — SIGNIFICANT CHANGE UP (ref 4.8–10.8)

## 2022-12-05 RX ORDER — MAGNESIUM SULFATE 500 MG/ML
2 VIAL (ML) INJECTION ONCE
Refills: 0 | Status: COMPLETED | OUTPATIENT
Start: 2022-12-05 | End: 2022-12-05

## 2022-12-05 RX ORDER — NITROFURANTOIN MACROCRYSTAL 50 MG
1 CAPSULE ORAL
Qty: 6 | Refills: 0
Start: 2022-12-05 | End: 2022-12-07

## 2022-12-05 RX ADMIN — OXYCODONE HYDROCHLORIDE 5 MILLIGRAM(S): 5 TABLET ORAL at 05:40

## 2022-12-05 RX ADMIN — OXYCODONE HYDROCHLORIDE 5 MILLIGRAM(S): 5 TABLET ORAL at 00:50

## 2022-12-05 RX ADMIN — OXYCODONE HYDROCHLORIDE 5 MILLIGRAM(S): 5 TABLET ORAL at 13:16

## 2022-12-05 RX ADMIN — CEFTRIAXONE 100 MILLIGRAM(S): 500 INJECTION, POWDER, FOR SOLUTION INTRAMUSCULAR; INTRAVENOUS at 10:56

## 2022-12-05 RX ADMIN — OXYCODONE HYDROCHLORIDE 5 MILLIGRAM(S): 5 TABLET ORAL at 06:06

## 2022-12-05 RX ADMIN — ENOXAPARIN SODIUM 40 MILLIGRAM(S): 100 INJECTION SUBCUTANEOUS at 08:54

## 2022-12-05 RX ADMIN — OXYCODONE HYDROCHLORIDE 5 MILLIGRAM(S): 5 TABLET ORAL at 01:00

## 2022-12-05 RX ADMIN — PANTOPRAZOLE SODIUM 40 MILLIGRAM(S): 20 TABLET, DELAYED RELEASE ORAL at 05:38

## 2022-12-05 RX ADMIN — Medication 650 MILLIGRAM(S): at 11:16

## 2022-12-05 RX ADMIN — Medication 25 GRAM(S): at 08:54

## 2022-12-05 RX ADMIN — Medication 650 MILLIGRAM(S): at 11:46

## 2022-12-05 NOTE — DISCHARGE NOTE NURSING/CASE MANAGEMENT/SOCIAL WORK - NSDCPEFALRISK_GEN_ALL_CORE
For information on Fall & Injury Prevention, visit: [email or web address] OR  [email or web address] OR  https://www.cdc.gov/steadi/patient.html

## 2022-12-05 NOTE — DISCHARGE NOTE PROVIDER - NSDCCPCAREPLAN_GEN_ALL_CORE_FT
PRINCIPAL DISCHARGE DIAGNOSIS  Diagnosis: Acute cholecystitis  Assessment and Plan of Treatment: Activity: No heavy lifting > 10 lbs for 2 weeks. Avoid straining or excessive activity x 6 weeks.   Incisions: Do not scrub wounds. You may shower but do not bathe. May use ice packs for pain and swelling.   Pain control: You may take over-the-counter tylenol and iburprofen three times per day with food for up to 3 days. Please adhere to the directions on the label.  Follow up: Please call the number provided to make an appointment with Dr. Reis in 1-2 weeks. Please call with any questions or concerns including fevers, worsening pain, pus from the wounds, or redness of the skin.      SECONDARY DISCHARGE DIAGNOSES  Diagnosis: Dilated cbd, acquired  Assessment and Plan of Treatment:      PRINCIPAL DISCHARGE DIAGNOSIS  Diagnosis: Acute cholecystitis  Assessment and Plan of Treatment: Activity: No heavy lifting > 10 lbs for 2 weeks. Avoid straining or excessive activity x 6 weeks.   Incisions: Do not scrub wounds. You may shower but do not bathe. May use ice packs for pain and swelling.   Pain control: You may take over-the-counter tylenol and iburprofen three times per day with food for up to 3 days. Please adhere to the directions on the label.  Follow up: Please call the number provided to make an appointment with Dr. Reis in 1-2 weeks. Please call with any questions or concerns including fevers, worsening pain, pus from the wounds, or redness of the skin.      SECONDARY DISCHARGE DIAGNOSES  Diagnosis: Dilated cbd, acquired  Assessment and Plan of Treatment:     Diagnosis: Acute UTI  Assessment and Plan of Treatment: You have a urinary tract infection. Antibiotics (called macrobid) were sent to your pharmacy. Please take them as directed and finish entire 3-day course.

## 2022-12-05 NOTE — DISCHARGE NOTE PROVIDER - CARE PROVIDER_API CALL
Saji Reis)  Surgery  [facility] [place] [address]  Phone: [phone]  Fax: [phone]  Follow Up Time: 1 week

## 2022-12-05 NOTE — DISCHARGE NOTE PROVIDER - INSTRUCTIONS
Continue low fat diet. Reduce intake of butter, cream, oils, fried foods and fatty meats. Low fat foods include fresh fruit, vegetables and legumes.

## 2022-12-05 NOTE — DISCHARGE NOTE PROVIDER - NSDCFUSCHEDAPPT_GEN_ALL_CORE_FT
Avtar Sood Physician [facility]  UROLOGY [address] [place] [address]  Scheduled Appointment: 01/19/2023

## 2022-12-05 NOTE — PROGRESS NOTE ADULT - SUBJECTIVE AND OBJECTIVE BOX
GENERAL SURGERY PROGRESS NOTE    Patient: MARLEY FRIEDMAN , 42y (76)Female   MRN: [id number]  Location: [address]  Visit: 22 Inpatient  Date: 22 @ 08:02    Hospital Day #: 2  Post-Op Day #: 1    Procedure/Dx/Injuries: acute cholecystitis s/p laparoscopic cholecystectomy    Events of past 24 hours: Patient underwent laparoscopic cholecystectomy without any complications. Examined at bedside, resting comfortably. Complaining of mild pain appropriate for post-op course. No nausea, no vomiting. Ambulating as tolerated. Advanced to low fat diet.     PAST MEDICAL & SURGICAL HISTORY:  Kidney stone  H/O  section  X 1    Vitals:   T(F): 97.4 (22 @ 05:00), Max: 98.2 (22 @ 18:35)  HR: 68 (22 @ 05:00)  BP: 141/74 (22 @ 05:00)  RR: 18 (22 @ 05:00)  SpO2: 97% (22 @ 05:00)    Diet, Regular:   Low Fat (LOWFAT)    Fluids:     I & O's:    22 @ 07:01  -  22 @ 07:00  --------------------------------------------------------  IN:    Lactated Ringers: 100 mL  Total IN: 100 mL    OUT:  Total OUT: 0 mL    Total NET: 100 mL    PHYSICAL EXAM:  General: NAD, AAOx3, calm and cooperative  Cardiac: RRR S1, S2  Respiratory: Chest rise equal bilaterally, normal respiratory effort, no labored breathing  Abdomen: Soft, non-distended, mildly tender  Skin: Warm/dry, normal color, no jaundice  Incision/wound: healing well, intact with topical skin adhesive, no signs of erythema, drainage or infection    MEDICATIONS  (STANDING):  acetaminophen     Tablet .. 650 milliGRAM(s) Oral every 6 hours  cefTRIAXone   IVPB 1000 milliGRAM(s) IV Intermittent every 24 hours  enoxaparin Injectable 40 milliGRAM(s) SubCutaneous every 24 hours  ibuprofen  Tablet. 600 milliGRAM(s) Oral every 6 hours  magnesium sulfate  IVPB 2 Gram(s) IV Intermittent once  pantoprazole    Tablet 40 milliGRAM(s) Oral before breakfast    MEDICATIONS  (PRN):  oxyCODONE    IR 5 milliGRAM(s) Oral every 6 hours PRN Severe Pain (7 - 10)    DVT PROPHYLAXIS: enoxaparin Injectable 40 milliGRAM(s) SubCutaneous every 24 hours    GI PROPHYLAXIS: pantoprazole    Tablet 40 milliGRAM(s) Oral before breakfast    ANTIBIOTICS:  cefTRIAXone   IVPB 1000 milliGRAM(s)      LAB/STUDIES:  Labs:  CAPILLARY BLOOD GLUCOSE               13.1   9.34  )-----------( 296      ( 05 Dec 2022 05:43 )             39.6       Auto Neutrophil %: 87.7 % (22 @ 05:43)  Auto Immature Granulocyte %: 0.2 % (22 @ 05:43)  Auto Neutrophil %: 73.9 % (22 @ 11:11)  Auto Immature Granulocyte %: 0.3 % (22 @ 11:11)        132<L>  |  99  |  8<L>  ----------------------------<  121<H>  4.4   |  24  |  0.6<L>      Calcium, Total Serum: 8.9 mg/dL (22 @ 05:43)      LFTs:             6.3  | 2.1  | 163      ------------------[202     ( 05 Dec 2022 05:43 )  3.9  | 1.0  | 209           Lactate, Blood: 1.3 mmol/L (22 @ 18:37)    Coags:     12.00  ----< 1.05    ( 04 Dec 2022 11:11 )     30.9      Urinalysis Basic - ( 03 Dec 2022 18:37 )    Color: Yellow / Appearance: Slightly Turbid / S.028 / pH: x  Gluc: x / Ketone: Negative  / Bili: Small / Urobili: 6 mg/dL   Blood: x / Protein: 100 mg/dL / Nitrite: Negative   Leuk Esterase: Large / RBC: 3 /HPF / WBC 7 /HPF   Sq Epi: x / Non Sq Epi: 24 /HPF / Bacteria: Few    IMAGING:  No post-op imaging    ACCESS/ DEVICES:  [x] Peripheral IV

## 2022-12-05 NOTE — DISCHARGE NOTE NURSING/CASE MANAGEMENT/SOCIAL WORK - PATIENT PORTAL LINK FT
You can access the FollowMyHealth Patient Portal offered by [facility] by registering at the following website: [email or web address]. By joining [employer]’s FollowMyHealth portal, you will also be able to view your health information using other applications (apps) compatible with our system.

## 2022-12-05 NOTE — DISCHARGE NOTE PROVIDER - HOSPITAL COURSE
41yo female with PMHx of nephrolithiasis s/p lithotripsy presents to the ED due to epigastric pain x 2 days associated with dizziness, nausea and 2 episodes of vomiting today. States that the pain has been constant since onset and worsened today prompting her to report to ED. Denies fevers, loss of appetite or constipation. In the ED, VS WNL, WBC 12, alk phos 156, , , t. bili 1.4. RUQ US obtained appreciating cholelithiasis with sludge and a CBD of 0.8cm. On exam, patient's abdomen is soft, nondistended, +ttp over epigastric region and worse tenderness over mid lower abdomen, no rebound or guarding. Patient was admitted to surgical service. Taken to OR for laparoscopic cholecystectomy on 12/4. Tolerated procedure well without any complications. Patient is hemodynamically stable, tolerating diet with no nausea or vomiting, voiding on her own, and ambulating. Patient is ready for discharge to home on 12/5. Follow up outpatient with Dr. Reis within 1-2 weeks.

## 2022-12-05 NOTE — PROGRESS NOTE ADULT - ASSESSMENT
ASSESSMENT:  42y F w/ PMHx of nephrolithiasis s/p lithotripsy presents to the ED due to epigastric pain x 2 days associated with dizziness, nausea and 2 episodes of vomiting today.  In the ED, VS WNL, WBC 12, alk phos 156, , , T. bili 1.4. RUQ US obtained appreciating cholelithiasis with sludge and a CBD of 0.8cm. On exam, patient's abdomen is soft, nondistended. Patient is now s/p laparoscopic cholecystectomy on 12/4.     PLAN:  - Monitor vitals  - Monitor labs and replete as necessary  - Advanced to low fat diet   - Monitor bowel function  - Monitor urine output, follow up TOV  - Pain control as needed  - DVT/GI ppx  - Anticipate for discharge home    [facility] Surgery  SPECTRA [id number]

## 2022-12-07 LAB — SURGICAL PATHOLOGY STUDY: SIGNIFICANT CHANGE UP

## 2022-12-09 ENCOUNTER — EMERGENCY (EMERGENCY)
Facility: HOSPITAL | Age: 42
LOS: 0 days | Discharge: HOME | End: 2022-12-09
Attending: EMERGENCY MEDICINE | Admitting: EMERGENCY MEDICINE

## 2022-12-09 VITALS
TEMPERATURE: 98 F | SYSTOLIC BLOOD PRESSURE: 120 MMHG | DIASTOLIC BLOOD PRESSURE: 65 MMHG | HEIGHT: 66 IN | OXYGEN SATURATION: 98 % | HEART RATE: 74 BPM | RESPIRATION RATE: 18 BRPM

## 2022-12-09 DIAGNOSIS — N85.8 OTHER SPECIFIED NONINFLAMMATORY DISORDERS OF UTERUS: ICD-10-CM

## 2022-12-09 DIAGNOSIS — Z20.822 CONTACT WITH AND (SUSPECTED) EXPOSURE TO COVID-19: ICD-10-CM

## 2022-12-09 DIAGNOSIS — R79.89 OTHER SPECIFIED ABNORMAL FINDINGS OF BLOOD CHEMISTRY: ICD-10-CM

## 2022-12-09 DIAGNOSIS — Z88.1 ALLERGY STATUS TO OTHER ANTIBIOTIC AGENTS STATUS: ICD-10-CM

## 2022-12-09 DIAGNOSIS — Z87.442 PERSONAL HISTORY OF URINARY CALCULI: ICD-10-CM

## 2022-12-09 DIAGNOSIS — Z98.890 OTHER SPECIFIED POSTPROCEDURAL STATES: ICD-10-CM

## 2022-12-09 DIAGNOSIS — Z96.0 PRESENCE OF UROGENITAL IMPLANTS: ICD-10-CM

## 2022-12-09 DIAGNOSIS — Z90.49 ACQUIRED ABSENCE OF OTHER SPECIFIED PARTS OF DIGESTIVE TRACT: ICD-10-CM

## 2022-12-09 DIAGNOSIS — E83.42 HYPOMAGNESEMIA: ICD-10-CM

## 2022-12-09 DIAGNOSIS — K80.00 CALCULUS OF GALLBLADDER WITH ACUTE CHOLECYSTITIS WITHOUT OBSTRUCTION: ICD-10-CM

## 2022-12-09 DIAGNOSIS — N39.0 URINARY TRACT INFECTION, SITE NOT SPECIFIED: ICD-10-CM

## 2022-12-09 DIAGNOSIS — Z98.891 HISTORY OF UTERINE SCAR FROM PREVIOUS SURGERY: Chronic | ICD-10-CM

## 2022-12-09 DIAGNOSIS — K83.8 OTHER SPECIFIED DISEASES OF BILIARY TRACT: ICD-10-CM

## 2022-12-09 LAB
ALBUMIN SERPL ELPH-MCNC: 4.3 G/DL — SIGNIFICANT CHANGE UP (ref 3.5–5.2)
ALP SERPL-CCNC: 234 U/L — HIGH (ref 30–115)
ALT FLD-CCNC: 187 U/L — HIGH (ref 0–41)
ANION GAP SERPL CALC-SCNC: 12 MMOL/L — SIGNIFICANT CHANGE UP (ref 7–14)
AST SERPL-CCNC: 50 U/L — HIGH (ref 0–41)
BASOPHILS # BLD AUTO: 0.06 K/UL — SIGNIFICANT CHANGE UP (ref 0–0.2)
BASOPHILS NFR BLD AUTO: 0.7 % — SIGNIFICANT CHANGE UP (ref 0–1)
BILIRUB DIRECT SERPL-MCNC: 0.5 MG/DL — HIGH (ref 0–0.3)
BILIRUB INDIRECT FLD-MCNC: 0.6 MG/DL — SIGNIFICANT CHANGE UP (ref 0.2–1.2)
BILIRUB SERPL-MCNC: 1.1 MG/DL — SIGNIFICANT CHANGE UP (ref 0.2–1.2)
BUN SERPL-MCNC: 8 MG/DL — LOW (ref 10–20)
CALCIUM SERPL-MCNC: 9.4 MG/DL — SIGNIFICANT CHANGE UP (ref 8.4–10.5)
CHLORIDE SERPL-SCNC: 100 MMOL/L — SIGNIFICANT CHANGE UP (ref 98–110)
CO2 SERPL-SCNC: 25 MMOL/L — SIGNIFICANT CHANGE UP (ref 17–32)
CREAT SERPL-MCNC: 0.6 MG/DL — LOW (ref 0.7–1.5)
EGFR: 113 ML/MIN/1.73M2 — SIGNIFICANT CHANGE UP
EOSINOPHIL # BLD AUTO: 0.27 K/UL — SIGNIFICANT CHANGE UP (ref 0–0.7)
EOSINOPHIL NFR BLD AUTO: 3.2 % — SIGNIFICANT CHANGE UP (ref 0–8)
GLUCOSE SERPL-MCNC: 86 MG/DL — SIGNIFICANT CHANGE UP (ref 70–99)
HCG SERPL QL: NEGATIVE — SIGNIFICANT CHANGE UP
HCT VFR BLD CALC: 41.1 % — SIGNIFICANT CHANGE UP (ref 37–47)
HGB BLD-MCNC: 13.9 G/DL — SIGNIFICANT CHANGE UP (ref 12–16)
IMM GRANULOCYTES NFR BLD AUTO: 0.4 % — HIGH (ref 0.1–0.3)
LIDOCAIN IGE QN: 33 U/L — SIGNIFICANT CHANGE UP (ref 7–60)
LYMPHOCYTES # BLD AUTO: 1.47 K/UL — SIGNIFICANT CHANGE UP (ref 1.2–3.4)
LYMPHOCYTES # BLD AUTO: 17.4 % — LOW (ref 20.5–51.1)
MCHC RBC-ENTMCNC: 29.2 PG — SIGNIFICANT CHANGE UP (ref 27–31)
MCHC RBC-ENTMCNC: 33.8 G/DL — SIGNIFICANT CHANGE UP (ref 32–37)
MCV RBC AUTO: 86.3 FL — SIGNIFICANT CHANGE UP (ref 81–99)
MONOCYTES # BLD AUTO: 0.46 K/UL — SIGNIFICANT CHANGE UP (ref 0.1–0.6)
MONOCYTES NFR BLD AUTO: 5.5 % — SIGNIFICANT CHANGE UP (ref 1.7–9.3)
NEUTROPHILS # BLD AUTO: 6.15 K/UL — SIGNIFICANT CHANGE UP (ref 1.4–6.5)
NEUTROPHILS NFR BLD AUTO: 72.8 % — SIGNIFICANT CHANGE UP (ref 42.2–75.2)
NRBC # BLD: 0 /100 WBCS — SIGNIFICANT CHANGE UP (ref 0–0)
PLATELET # BLD AUTO: 307 K/UL — SIGNIFICANT CHANGE UP (ref 130–400)
POTASSIUM SERPL-MCNC: 4.7 MMOL/L — SIGNIFICANT CHANGE UP (ref 3.5–5)
POTASSIUM SERPL-SCNC: 4.7 MMOL/L — SIGNIFICANT CHANGE UP (ref 3.5–5)
PROT SERPL-MCNC: 7.1 G/DL — SIGNIFICANT CHANGE UP (ref 6–8)
RBC # BLD: 4.76 M/UL — SIGNIFICANT CHANGE UP (ref 4.2–5.4)
RBC # FLD: 13.8 % — SIGNIFICANT CHANGE UP (ref 11.5–14.5)
SODIUM SERPL-SCNC: 137 MMOL/L — SIGNIFICANT CHANGE UP (ref 135–146)
WBC # BLD: 8.44 K/UL — SIGNIFICANT CHANGE UP (ref 4.8–10.8)
WBC # FLD AUTO: 8.44 K/UL — SIGNIFICANT CHANGE UP (ref 4.8–10.8)

## 2022-12-09 PROCEDURE — 99285 EMERGENCY DEPT VISIT HI MDM: CPT

## 2022-12-09 PROCEDURE — 76705 ECHO EXAM OF ABDOMEN: CPT | Mod: 26

## 2022-12-09 RX ORDER — SODIUM CHLORIDE 9 MG/ML
1000 INJECTION, SOLUTION INTRAVENOUS ONCE
Refills: 0 | Status: COMPLETED | OUTPATIENT
Start: 2022-12-09 | End: 2022-12-09

## 2022-12-09 RX ADMIN — SODIUM CHLORIDE 1000 MILLILITER(S): 9 INJECTION, SOLUTION INTRAVENOUS at 10:56

## 2022-12-09 NOTE — CONSULT NOTE ADULT - ASSESSMENT
ASSESSMENT:  Patient is a 42 year old female with PMHx of nephrolithiasis s/p lithotripsy and laparoscopic cholecystectomy on 12/4/22 with Dr. Reis who presented to ED today due to elevated LFT's and possible jaundice.. Physical exam findings, imaging, and labs as documented above.     PLAN:  - Patient is not jaundiced on exam and has downtrending LFT's. Which combined with benign abdominal exam and CBD decreased from prior imaging, no concern for choledocholithiasis. Patient is able to tolerate PO diet and is having gas and bowel movements. Patient is safe to be discharged home. Has been instructed to follow up with Dr. Reis   - Plan discussed with Dr. Reis.     Above plan discussed with Attending Surgeon Dr. Reis  , patient, patient family, and Primary team  12-09-22 @ 12:53    CONSULT SPECTRA: [id number]

## 2022-12-09 NOTE — ED PROVIDER NOTE - CARE PLAN
Assessment and plan of treatment:	Plan: labs, ivf, ruq sono, reassess.   1 Principal Discharge DX:	Elevated liver enzymes  Assessment and plan of treatment:	Plan: labs, ivf, ruq sono, reassess.

## 2022-12-09 NOTE — ED PROVIDER NOTE - OBJECTIVE STATEMENT
Patient is a 42y F pmhx nephrolithiasis s/p lithotripsy recently s/p cholecycstectomy sent in by her surgeon for abnormal LFTs. Patient underwent laparoscopic cholecystectomy with Dr. Reis.

## 2022-12-09 NOTE — ED PROVIDER NOTE - PATIENT PORTAL LINK FT
You can access the FollowMyHealth Patient Portal offered by [facility] by registering at the following website: [email or web address]. By joining [employer]’s FollowMyHealth portal, you will also be able to view your health information using other applications (apps) compatible with our system.

## 2022-12-09 NOTE — ED PROVIDER NOTE - CARE PROVIDER_API CALL
Saji Reis)  Surgery  [address] [place] [address]  Phone: [phone]  Fax: [phone]  Follow Up Time:

## 2022-12-09 NOTE — ED PROVIDER NOTE - NSFOLLOWUPINSTRUCTIONS_ED_ALL_ED_FT
FOLLOW UP WITH DR. SIBLEY AND YOUR PRIMARY PHYSICIAN. FOLLOW UP WITH DR. SIBLEY AND YOUR PRIMARY PHYSICIAN.    Liver Function Tests  Why am I having this test?  Liver function tests are done to see how well your liver is working. The proteins and enzymes measured in the test can alert your health care provider to inflammation, damage, or disease in your liver. It is common to have liver function tests:  When you are taking certain medicines.  If you have liver disease.  If you drink a lot of alcohol.  When you are not feeling well.  When you have other conditions that may affect your liver.  During annual physical exams.  If you have symptoms such as yellowing of the skin (jaundice), abdominal pain, or nausea and vomiting.  What is being tested?  These tests measure various substances in your blood. This may include:  Alanine transaminase (ALT). This is an enzyme in the liver.  Aspartate transaminase (AST). This is an enzyme in the liver, heart, and muscles.  Alkaline phosphatase (ALP). This is a protein in the liver, bile ducts, bone, and other body tissues.  Total bilirubin. This is a yellow pigment in bile.  Albumin. This is a protein in the liver.  Prothrombin time and international normalized ratio (PT and INR). PT measures the time it takes for your blood to clot. INR is a calculation of blood clotting time based on your PT result. It is also calculated based on normal ranges defined by the lab that processed your test.  Total protein. This includes two proteins, albumin and globulin, found in the blood.  What kind of sample is taken?  Image   A blood sample is required for this test. It is usually collected by inserting a needle into a blood vessel.    How do I prepare for this test?  How you prepare will depend on which tests are being done and the reason for doing them. You may need to:  Avoid eating for 4–6 hours before the test, or as told by your health care provider.  Stop taking certain medicines before your blood test, as told by your health care provider.  Tell a health care provider about:  All medicines you are taking, including vitamins, herbs, eye drops, creams, and over-the-counter medicines.  Any medical conditions you have.  Whether you are pregnant or may be pregnant.  How are the results reported?  Your test results will be reported as values. Your health care provider will compare your results to normal ranges that were established after testing a large group of people (reference ranges). Reference ranges may vary among labs and hospitals. For the substances measured in liver function tests, common reference ranges are:    ALT     Infant: 10–40 international units/L.  Child or adult: 4–36 international units/L at 37°C or 4–36 units/L (SI units).  Reference ranges may be higher for older adults.  AST     Cassoday 0–5 days old: 35–140 units/L.  Child younger than 3 years old: 15–60 units/L.  3–6 years old: 15–50 units/L.  6–12 years old: 10–50 units/L.  12–18 years old: 10–40 units/L.  Adult: 0–35 units/L or 0–0.58 µkat/L (SI units).  Reference ranges may be higher for older adults.  ALP     Child younger than 2 years old: 85–235 units/L.  2–8 years old: 65–210 units/L.  9–15 years old: 60–300 units/L.  16–21 years old: 30–200 units/L.  Adult: 30–120 units/L or 0.5–2.0 µkat/L (SI units).  Reference ranges may be higher for older adults.  Total bilirubin     Cassoday: 1.0–12.0 mg/dL or 17.1–205 µmol/L (SI units).  Child or adult: 0.3–1.0 mg/dL or 5.1–17 µmol/L.  Albumin     Premature infant: 3.0–4.2 g/dL.  Cassoday: 3.5–5.4 g/dL.  Infant: 4.4–5.4 g/dL.  Child: 4.0–5.9 g/dL.  Adult: 3.5–5.0 g/dL or 35–50 g/L (SI units).  PT     11.0–12.5 seconds; 85%–100%.  INR     0.8–1.1.  Total protein     Premature infant: 4.2–7.6 g/dL.  Cassoday: 4.6–7.4 g/dL.  Infant: 6.0–6.7 g/dL.  Child: 6.2–8.0 g/dL.  Adult: 6.4–8.3 g/dL or 64–83 g/L (SI units).  What do the results mean?  Results that are within the reference ranges are considered normal. For each substance measured, results outside the reference range can indicate various health issues.    ALT     Levels above the normal range may indicate liver disease.  AST     Levels above the normal range may indicate liver disease. Sometimes levels also increase after burns, surgery, heart attack, muscle damage, or seizure.  ALP     Levels above the normal range may be seen in biliary obstruction, liver diseases, bone disease, thyroid disease, tumors, fractures, leukemia, lymphoma, or several other conditions. People with blood type O or B may show higher levels after a fatty meal.  Levels below the normal range may indicate bone and teeth conditions, malnutrition, protein deficiency, or Mao's disease.  Total bilirubin     Levels above the normal range may indicate problems with the liver, gallbladder, or bile ducts.  Albumin     Levels above the normal range may indicate dehydration. They may also be caused by a diet that is high in protein.  Levels below the normal range may indicate kidney disease, liver disease, or malabsorption of nutrients.  PT and INR     Levels above the normal range mean that your blood is clotting slower than normal. This may be due to blood disorders, liver disorders, or low levels of vitamin K.  Total protein     Levels above the normal range may be due to infection or other diseases.  Levels below the normal range may be due to an immune system disorder, bleeding, burns, kidney disorder, liver disease, trouble absorbing or getting nutrients, or other conditions that affect the intestines.  Talk with your health care provider about what your results mean.    Questions to ask your health care provider  Ask your health care provider, or the department that is doing the test:  When will my results be ready?  How will I get my results?  What are my treatment options?  What other tests do I need?  What are my next steps?  Summary  Liver function tests are done to see how well your liver is working.  These tests measure various proteins and enzymes in your blood. The results can alert your health care provider to inflammation, damage, or disease in your liver.  Talk with your health care provider about what your results mean.  This information is not intended to replace advice given to you by your health care provider. Make sure you discuss any questions you have with your health care provider.

## 2022-12-09 NOTE — ED PROVIDER NOTE - CLINICAL SUMMARY MEDICAL DECISION MAKING FREE TEXT BOX
42-year-old female with past medical history of nephrolithiasis status post lithotripsy admission from December 4 to December 5 for acute cholestatic cystitis status post laparoscopic cholecystectomy on December 4 with Dr. Reis presents for concern of elevated LFTs.  Patient reports that she noticed right eye jaundice yesterday and had lab work done as requested by her surgeon and was told that it was elevated today and needed to come to the emergency department.  Patient reports approximately 5 days ago she was experiencing abdominal pain but that subsided.  Patient eating, tolerating p.o., passing flatus, normal bowel movements. lft's noted and comparable to previous, surgery revaluated pt and report cleared to go home, pt abd re exam soft ntnd nor /g, tolerating po, pt with no fever, white count, reports feeling better at this remedios, will follow up with her pmd and surgeon as discussed.

## 2022-12-09 NOTE — ED PROVIDER NOTE - PROGRESS NOTE DETAILS
MM: Surgery consulted, will eval patient. MM: Surg evaluated patient, ok with discharge and follow up with surgeon and PMD. Patient is well appearing, NAD, afebrile, hemodynamically stable. Any available tests and studies were discussed with patient and family. Discharged with instructions in further symptomatic care, return precautions, and need for PMD f/u.

## 2022-12-09 NOTE — ED PROVIDER NOTE - PHYSICAL EXAMINATION
Vital Signs: I have reviewed the initial vital signs.  Constitutional: well-nourished, appears stated age, no acute distress.  HEENT: Airway patent, moist MM, no jaundice or scleral icterus noted  CV: regular rate, regular rhythm, well-perfused extremities,  Lungs: Clear to ascultation bilaterally, no increased work of breathing.  ABD: Non-tender, Non-distended, no flank pain.   MSK: Neck supple, nontender, normal range of motion, Able to ambulate without assistance  INTEG: Skin warm, dry, no rash.  NEURO: A&Ox3, moving all extremities, normal speech  PSYCH: Calm, cooperative, normal affect and interaction.

## 2022-12-09 NOTE — ED ADULT NURSE NOTE - PRO INTERPRETER NEED 2
You will be scheduled for a stress echocardiogram. Please wear comfortable clothes and shoes. Please do not eat or drink anything other than water two hours prior to test. Please do not take your metoprolol the day before or the day of the test. We will call with results and see you back for an annual follow up with fasting blood work one week prior at the [facility] on the first floor (Monday-Friday 7am-5pm).
English

## 2022-12-09 NOTE — CONSULT NOTE ADULT - SUBJECTIVE AND OBJECTIVE BOX
GENERAL SURGERY CONSULT NOTE    Patient: MARLEY FRIEDMAN , 42y (76)Female   MRN: [id number]  Location: [facility] ED  Visit: 22 Emergency  Date: 22 @ 12:53    HPI: Patient is a 42 year old female with PMHx of nephrolithiasis s/p lithotripsy and laparoscopic cholecystectomy on 22 with Dr. Reis who presented to ED today due to elevated LFT's and possible jaundice. Patient states that she went to her followup visit and had blood work done at [facility] which showed elevated LFT's. Patient also was concerned that she was "yellow" and therefore was told to come into ED by Dr. Reis to be evaluated. Patient has decreasing LFT's and RUQ U/S showed CBD of 5mm compared to 1cm on MRCP on . Patient states that she feels fine today with no pain at incision sites and has been having gas and bowel movements. Patient states she wants to go home. No fevers, diarrhea, or other concerning symptoms.       PAST MEDICAL & SURGICAL HISTORY:  Kidney stone      H/O  section  X 1          Home Medications:        VITALS:  T(F): 98.3 (22 @ 08:34), Max: 98.3 (22 @ 08:34)  HR: 74 (22 @ 08:34) (74 - 74)  BP: 120/65 (22 @ 08:34) (120/65 - 120/65)  RR: 18 (22 @ 08:34) (18 - 18)  SpO2: 98% (22 @ 08:34) (98% - 98%)    PHYSICAL EXAM:  General: NAD, AAOx3, calm and cooperative  HEENT: NCAT, DAPHNIE, EOMI, Trachea ML, Neck supple  Cardiac: RRR S1, S2, no Murmurs, rubs or gallops  Respiratory: CTAB, normal respiratory effort  Abdomen: Soft, non-distended, slightly tender at incision sites with some bruising, no rebound or guarding.        MEDICATIONS  (STANDING):    MEDICATIONS  (PRN):      LAB/STUDIES:                        13.9   8.44  )-----------( 307      ( 09 Dec 2022 10:44 )             41.1     12-09    137  |  100  |  8<L>  ----------------------------<  86  4.7   |  25  |  0.6<L>    Ca    9.4      09 Dec 2022 10:44    TPro  7.1  /  Alb  4.3  /  TBili  1.1  /  DBili  0.5<H>  /  AST  50<H>  /  ALT  187<H>  /  AlkPhos  234<H>        LIVER FUNCTIONS - ( 09 Dec 2022 10:44 )  Alb: 4.3 g/dL / Pro: 7.1 g/dL / ALK PHOS: 234 U/L / ALT: 187 U/L / AST: 50 U/L / GGT: x             IMAGING:  < from: US Abdomen Upper Quadrant Right (22 @ 10:36) >  IMPRESSION:    Post cholecystectomy.    Resolved common bile duct dilation seen on prior exam.    Hepatic steatosis.    < end of copied text >

## 2022-12-09 NOTE — ED PROVIDER NOTE - ATTENDING CONTRIBUTION TO CARE
42-year-old female with past medical history of nephrolithiasis status post lithotripsy admission from December 4 to December 5 for acute cholestatic cystitis status post laparoscopic cholecystectomy on December 4 with Dr. Reis presents for concern of elevated LFTs.  Patient reports that she noticed right eye jaundice yesterday and had lab work done as requested by her surgeon and was told that it was elevated today and needed to come to the emergency department.  Patient reports approximately 5 days ago she was experiencing abdominal pain but that subsided.  Patient eating, tolerating p.o., passing flatus, normal bowel movements.  No fever, chills, n/v, cp,  pleuritic cp, sob, palpitations, diaphoresis, cough, ha/lh/dizziness, numbness/tingling, neck pain/ stiffness, current abd pain, diarrhea, constipation, melena/brbpr, urinary symptoms, vaginal bleeding/discharge/odor, trauma, weakness, edema, calf pain/swelling/erythema, sick contacts, recent travel or rash.     On Exam: Vital Signs: I have reviewed the initial vital signs. Constitutional: Non toxic appearing pt sitting on stretcher speaking full sentences. Integumentary: No rash. No jaundice. Abdominal surgical scars: c/d/i. no erythema  or streaking, no warmth to palpation, no crepitus, induration, fluctuance, no discharge, no abscess EYES: Mild sclera Icterus. ENT: MMM NECK: Supple, non-tender, no meningeal signs. Cardiovascular: RRR, radial pulses 2/4 b/l. No JVD. Respiratory: BS present b/l, ctabl, no wheezing or crackles, no accessory muscle use, no stridor. Gastrointestinal: BS present throughout all 4 quadrants, soft, nd, nt, no rebound tenderness or guarding, no cvat. Musculoskeletal: FROM, no edema, no calf pain/swelling/erythema. Neurologic: AAOx3, motor 5/5 and sensation intact throughout upper and lowe ext, CN II-XII intact, No facial droop or slurring of speech. No focal deficits. 42-year-old female with past medical history of nephrolithiasis status post lithotripsy admission from December 4 to December 5 for acute cholestatic cystitis status post laparoscopic cholecystectomy on December 4 with Dr. Reis presents for concern of elevated LFTs.  Patient reports that she noticed right eye jaundice yesterday and had lab work done as requested by her surgeon and was told that it was elevated today and needed to come to the emergency department.  Patient reports approximately 5 days ago she was experiencing abdominal pain but that subsided.  Patient eating, tolerating p.o., passing flatus, normal bowel movements.  No fever, chills, n/v, cp,  pleuritic cp, sob, palpitations, diaphoresis, cough, ha/lh/dizziness, numbness/tingling, neck pain/ stiffness, current abd pain, diarrhea, constipation, melena/brbpr, urinary symptoms, vaginal bleeding/discharge/odor, trauma, weakness, edema, calf pain/swelling/erythema, sick contacts, recent travel or rash.     On Exam: Vital Signs: I have reviewed the initial vital signs. Constitutional: Non toxic appearing pt sitting on stretcher speaking full sentences. Integumentary: No rash. No jaundice. Abdominal surgical scars: c/d/i. no erythema  or streaking, no warmth to palpation, no crepitus, induration, fluctuance, no discharge, no abscess EYES: no sclera Icterus. ENT: MMM NECK: Supple, non-tender, no meningeal signs. Cardiovascular: RRR, radial pulses 2/4 b/l. No JVD. Respiratory: BS present b/l, ctabl, no wheezing or crackles, no accessory muscle use, no stridor. Gastrointestinal: BS present throughout all 4 quadrants, soft, nd, nt, no rebound tenderness or guarding, no cvat. Musculoskeletal: FROM, no edema, no calf pain/swelling/erythema. Neurologic: AAOx3, motor 5/5 and sensation intact throughout upper and lowe ext, CN II-XII intact, No facial droop or slurring of speech. No focal deficits.

## 2023-01-19 ENCOUNTER — APPOINTMENT (OUTPATIENT)
Dept: UROLOGY | Facility: CLINIC | Age: 43
End: 2023-01-19
Payer: MEDICAID

## 2023-01-19 VITALS
TEMPERATURE: 97.3 F | RESPIRATION RATE: 16 BRPM | DIASTOLIC BLOOD PRESSURE: 72 MMHG | HEIGHT: 66 IN | OXYGEN SATURATION: 98 % | WEIGHT: 179 LBS | SYSTOLIC BLOOD PRESSURE: 108 MMHG | BODY MASS INDEX: 28.77 KG/M2 | HEART RATE: 72 BPM

## 2023-01-19 PROCEDURE — 99214 OFFICE O/P EST MOD 30 MIN: CPT

## 2023-01-19 NOTE — ADDENDUM
[FreeTextEntry1] : Patient's note was transcribed with the assistance of a medical scribe under the supervision of Dr. Sood.\par I, Dr. Sood, have reviewed the patient's chart and agree that it aligns with my medical decisions.\par Josefina Marvin, our scribe, also served as a chaperone for physical examination purposes.\par \par \par

## 2023-01-19 NOTE — HISTORY OF PRESENT ILLNESS
[FreeTextEntry1] : MARLEY FRIEDMAN is a 42 year old female heavy smoker [employer], with past medical history of UTIs who presents for consultation for recurrent UTIs in the past 2 years. Right 7 mm stone confirmed on KUB and renal ultrasound. sp right eswl stone appears to have fragmented into multiple tiny fragments 2/2022.  History of recurrent urinary tract infection now managed on methenamine.  Renal/bladder ultrasound demonstrates punctate fragment in the right lower pole\par \par Pt has been doing well on methenamine and has not had symptomatic UTIs since the last visit. She denies any gross hematuria or dysuria at this time and only reports nocturia 1-2x. \par \par 12/03/22 UA - pyuria , epithelial cells and bacteruria \par CT abdomen pelvis with iv contrast 12/2022 images visualized -  no kidney stones , no hydronephrosis bilaterally \par \par previously \par Doing well denies any signs/symptoms of urinary tract infection.  Denies any episodes of renal colic.\par \par Renal/bladder ultrasound, from 6/28/2022, demonstrates 3 mm lower pole calculus.  Urinary bladder prevoid 260 cc postvoid 17 cc images visualized agree w findings \par \par Previously:\par KUB images from 3/2022: No definitive calcifications overlying the expected regions of the kidneys. IUD overlying the pelvis. Nonobstructive bowel gas pattern.\par \par KUB from 12/2021 images visualized: 6-7 mm right lower pole renal calculus.\par \par Previously: \par Urine culture E. coli greater than 100,000\par Urinalysis negative for lab threshold microscopic hematuria\par \par Additionally, she has history of renal stone.\par Renal ultrasound images from 9/22/2021, demonstrates right lower pole stone, 7 x 6 mm. Urinary bladder demonstrates bilateral ureteral jets with a prevoid volume of 246 cc and the postvoid volume of 41 cc. No hydronephrosis bilaterally.\par \par KUB x-ray, from 10/12/2021 demonstrates a 7 mm calcification over the right renal shadow, which may represent calculus. \par \par Previously:\par Renal/bladder ultrasound images demonstrated no hydronephrosis bilaterally with a lower pole 7 x 6 mm focus, possibly nonobstructing stone, in the right lower pole. 9/2021\par \par  PMH: reports at least 1 UTI per year \par Family History: No  malignancies\par Social History: . Vaginal [place]. Consumes 3 coffees per day. Pt is COVID19 vaccinated. \par Hookah smoker. Allergic to Cipro. Daughter Pharmacy major. \par \par UCx: \par 8/26/2021: E.Coli resist to cipro/levaquin\par 7/08/2021: E.Coli \par 6/30/2021: E. Coli \par 5/26/2021 E.Coli  \par

## 2023-01-19 NOTE — ASSESSMENT
[FreeTextEntry1] : MARLEY FRIEDMAN is a 42 year old female heavy smoker [employer], with past medical history of UTIs who presents for consultation for recurrent UTIs in the past 2 years. Right 7 mm stone confirmed on KUB and renal ultrasound. sp right eswl stone appears to have fragmented into multiple tiny fragments 2/2022.  History of recurrent urinary tract infection now managed on methenamine.  \par Latest CT 2022 stone free\par \par Plan \par Pt will continue methenamine for 6 more months and she will then discontinue.\par f/u in 12 months for symptoms assessment with KUB prior \par -continue stone dietary prevention \par \par

## 2023-05-03 RX ORDER — METHENAMINE HIPPURATE 1 G/1
1 TABLET ORAL TWICE DAILY
Qty: 180 | Refills: 3 | Status: ACTIVE | COMMUNITY
Start: 2021-11-09 | End: 1900-01-01

## 2023-06-23 ENCOUNTER — NON-APPOINTMENT (OUTPATIENT)
Age: 43
End: 2023-06-23

## 2023-08-15 ENCOUNTER — APPOINTMENT (OUTPATIENT)
Dept: UROLOGY | Facility: CLINIC | Age: 43
End: 2023-08-15
Payer: MEDICAID

## 2023-08-15 VITALS
SYSTOLIC BLOOD PRESSURE: 113 MMHG | DIASTOLIC BLOOD PRESSURE: 71 MMHG | WEIGHT: 173 LBS | HEIGHT: 66 IN | BODY MASS INDEX: 27.8 KG/M2 | HEART RATE: 74 BPM

## 2023-08-15 DIAGNOSIS — Z87.442 PERSONAL HISTORY OF URINARY CALCULI: ICD-10-CM

## 2023-08-15 DIAGNOSIS — R82.71 BACTERIURIA: ICD-10-CM

## 2023-08-15 LAB
BILIRUB UR QL STRIP: NORMAL
COLLECTION METHOD: NORMAL
GLUCOSE UR-MCNC: NORMAL
HCG UR QL: 0.2 EU/DL
HGB UR QL STRIP.AUTO: NORMAL
KETONES UR-MCNC: NORMAL
LEUKOCYTE ESTERASE UR QL STRIP: NORMAL
NITRITE UR QL STRIP: NORMAL
PH UR STRIP: 5.5
PROT UR STRIP-MCNC: NORMAL
SP GR UR STRIP: 1.02

## 2023-08-15 PROCEDURE — 99214 OFFICE O/P EST MOD 30 MIN: CPT

## 2023-08-15 NOTE — HISTORY OF PRESENT ILLNESS
[FreeTextEntry1] : MARLEY FRIEDMAN is a 43 year old female heavy smoker [employer], with past medical history of UTIs who presents for consultation for recurrent UTIs in the past 2 years. Right 7 mm stone confirmed on KUB and renal ultrasound. sp right eswl stone appears to have fragmented into multiple tiny fragments 2/2022.  History of recurrent urinary tract infection now managed on methenamine.   Latest CT 2022 stone free  Pt reports having UTI 3x since initial visit in January. Per pt she has intercourse almost every day. Denies flank pain, gross hematuria, dysuria or associated symptoms.  Udip today trace leuk no microheme no nitrite  UCx 06/2023 - 10,000 - 49,000 E. coli and 10,000 - 49,000 coagulase neg staphylococcus, not s. saprophyticus UCx 07/2023 - mixed genital marina  previously, 12/03/22 UA - pyuria , epithelial cells and bacteruria  CT abdomen pelvis with iv contrast 12/2022 images visualized -  no kidney stones , no hydronephrosis bilaterally   Renal/bladder ultrasound, from 6/28/2022, demonstrates 3 mm lower pole calculus.  Urinary bladder prevoid 260 cc postvoid 17 cc images visualized agree w findings   KUB images from 3/2022: No definitive calcifications overlying the expected regions of the kidneys. IUD overlying the pelvis. Nonobstructive bowel gas pattern. KUB from 12/2021 images visualized: 6-7 mm right lower pole renal calculus.  Urine culture E. coli greater than 100,000 Urinalysis negative for lab threshold microscopic hematuria  Additionally, she has history of renal stone. Renal ultrasound images from 9/22/2021, demonstrates right lower pole stone, 7 x 6 mm. Urinary bladder demonstrates bilateral ureteral jets with a prevoid volume of 246 cc and the postvoid volume of 41 cc. No hydronephrosis bilaterally.  KUB x-ray, from 10/12/2021 demonstrates a 7 mm calcification over the right renal shadow, which may represent calculus.   Previously: Renal/bladder ultrasound images demonstrated no hydronephrosis bilaterally with a lower pole 7 x 6 mm focus, possibly nonobstructing stone, in the right lower pole. 9/2021   PMH: reports at least 1 UTI per year  Family History: No  malignancies Social History: . Vaginal [place]. Consumes 3 coffees per day.  Hookah smoker. Allergic to Cipro. Daughter Pharmacy major.   UCx:  8/26/2021: E.Coli resist to cipro/levaquin 7/08/2021: E.Coli  6/30/2021: E. Coli  5/26/2021 E.Coli

## 2023-08-15 NOTE — ADDENDUM
[FreeTextEntry1] : Patient's note was transcribed with the assistance of a medical scribe under the supervision of Dr. Sood. I, Dr. Sood, have reviewed the patient's chart and agree that it aligns with my medical decisions. Kenan Osorio, our scribe, also served as a chaperone for physical examination purposes.

## 2023-08-15 NOTE — ASSESSMENT
[FreeTextEntry1] : MARLEY FRIEDMAN is a 43 year old female heavy smoker [employer], with past medical history of UTIs who presents for consultation for recurrent UTIs in the past 2 years. Right 7 mm stone confirmed on KUB and renal ultrasound. sp right eswl stone appears to have fragmented into multiple tiny fragments 2/2022.  History of recurrent urinary tract infection now managed on methenamine.   Latest CT 2022 stone free  Has had recurrent urinary tract infections despite methenamine. These seem to be postcoital however she does have intercourse daily.  She elects to work on - conservative measures for UTI prevention, increase hydration and timely voiding, voiding pre and post intercourse which patient has not been doing. - increase hydration for stone prevention as well. - in future consider post coital antibiotics versus more likely daily cont methenamine for now - f/u 6 months, KUB and RBUS

## 2023-09-10 ENCOUNTER — OUTPATIENT (OUTPATIENT)
Dept: OUTPATIENT SERVICES | Facility: HOSPITAL | Age: 43
LOS: 1 days | End: 2023-09-10
Payer: MEDICAID

## 2023-09-10 DIAGNOSIS — Z98.891 HISTORY OF UTERINE SCAR FROM PREVIOUS SURGERY: Chronic | ICD-10-CM

## 2023-09-10 DIAGNOSIS — Z00.8 ENCOUNTER FOR OTHER GENERAL EXAMINATION: ICD-10-CM

## 2023-09-10 DIAGNOSIS — N39.0 URINARY TRACT INFECTION, SITE NOT SPECIFIED: ICD-10-CM

## 2023-09-10 PROCEDURE — 76770 US EXAM ABDO BACK WALL COMP: CPT | Mod: 26

## 2023-09-10 PROCEDURE — 76770 US EXAM ABDO BACK WALL COMP: CPT

## 2023-09-11 DIAGNOSIS — N39.0 URINARY TRACT INFECTION, SITE NOT SPECIFIED: ICD-10-CM

## 2023-09-12 ENCOUNTER — NON-APPOINTMENT (OUTPATIENT)
Age: 43
End: 2023-09-12

## 2023-11-06 ENCOUNTER — OUTPATIENT (OUTPATIENT)
Dept: OUTPATIENT SERVICES | Facility: HOSPITAL | Age: 43
LOS: 1 days | End: 2023-11-06
Payer: COMMERCIAL

## 2023-11-06 ENCOUNTER — RESULT REVIEW (OUTPATIENT)
Age: 43
End: 2023-11-06

## 2023-11-06 DIAGNOSIS — N39.0 URINARY TRACT INFECTION, SITE NOT SPECIFIED: ICD-10-CM

## 2023-11-06 DIAGNOSIS — N20.0 CALCULUS OF KIDNEY: ICD-10-CM

## 2023-11-06 DIAGNOSIS — Z98.891 HISTORY OF UTERINE SCAR FROM PREVIOUS SURGERY: Chronic | ICD-10-CM

## 2023-11-06 PROCEDURE — 74018 RADEX ABDOMEN 1 VIEW: CPT | Mod: 26

## 2023-11-06 PROCEDURE — 74018 RADEX ABDOMEN 1 VIEW: CPT

## 2023-11-07 DIAGNOSIS — N39.0 URINARY TRACT INFECTION, SITE NOT SPECIFIED: ICD-10-CM

## 2023-11-07 DIAGNOSIS — N20.0 CALCULUS OF KIDNEY: ICD-10-CM

## 2023-11-10 ENCOUNTER — NON-APPOINTMENT (OUTPATIENT)
Age: 43
End: 2023-11-10

## 2024-01-22 ENCOUNTER — APPOINTMENT (OUTPATIENT)
Dept: UROLOGY | Facility: CLINIC | Age: 44
End: 2024-01-22
Payer: COMMERCIAL

## 2024-01-22 VITALS
HEIGHT: 66 IN | HEART RATE: 74 BPM | SYSTOLIC BLOOD PRESSURE: 106 MMHG | WEIGHT: 175 LBS | DIASTOLIC BLOOD PRESSURE: 74 MMHG | BODY MASS INDEX: 28.12 KG/M2

## 2024-01-22 DIAGNOSIS — N39.0 URINARY TRACT INFECTION, SITE NOT SPECIFIED: ICD-10-CM

## 2024-01-22 DIAGNOSIS — N20.0 CALCULUS OF KIDNEY: ICD-10-CM

## 2024-01-22 LAB
BILIRUB UR QL STRIP: NORMAL
COLLECTION METHOD: NORMAL
GLUCOSE UR-MCNC: NORMAL
HCG UR QL: 0.2 EU/DL
HGB UR QL STRIP.AUTO: NORMAL
KETONES UR-MCNC: NORMAL
LEUKOCYTE ESTERASE UR QL STRIP: NORMAL
NITRITE UR QL STRIP: NORMAL
PH UR STRIP: 6
PROT UR STRIP-MCNC: NORMAL
SP GR UR STRIP: 1.02

## 2024-01-22 PROCEDURE — 99214 OFFICE O/P EST MOD 30 MIN: CPT

## 2024-01-22 PROCEDURE — 81003 URINALYSIS AUTO W/O SCOPE: CPT | Mod: QW

## 2024-01-22 NOTE — ASSESSMENT
[FreeTextEntry1] : MARLEY FRIEDMAN is a 43-year-old female heavy smoker [employer], with past medical history of UTIs who presents for consultation for recurrent UTIs in the past 2 years. Right 7 mm stone confirmed on KUB and renal ultrasound. sp right eswl stone appears to have fragmented into multiple tiny fragments 2/2022. History of recurrent urinary tract infection previously managed on methenamine.    For chronic recurrent UTIs cont conservative measures for UTI prevention, increase hydration and timely voiding, voiding pre and post intercourse which patient has not been doing. -For chronic nephrolithiasis continue increase hydration for stone prevention as well. -Since She is stone free and has not had urinary tract infections she will follow-up as needed

## 2024-01-22 NOTE — HISTORY OF PRESENT ILLNESS
[FreeTextEntry1] : MARLEY FRIEDMAN is a 43-year-old female heavy smoker [employer], with past medical history of UTIs who presents for consultation for recurrent UTIs in the past 2 years. Right 7 mm stone confirmed on KUB and renal ultrasound. sp right eswl stone appears to have fragmented into multiple tiny fragments 2/2022. History of recurrent urinary tract infection now managed on methenamine.   Pt stopped taking methenamine a while ago and has had no recent UTIs since last visit. Stable urination Denies flank pain, gross hematuria, dysuria or associated symptoms.   RBUS 09/10/2023 - images independently reviewed by me  IMPRESSION: Mild post void residual. Otherwise, unremarkable renal and urinary bladder ultrasound. (Prevoid volume of approximately 645 ml.  Postvoid volume is approximately 59 ml.)  KUB 11/06/2023 - images independently reviewed by me  IMPRESSION: No definitive renal calculus. Nonobstructive bowel gas pattern.  previously UCx 06/2023 - 10,000 - 49,000 E. coli and 10,000 - 49,000 coagulase neg staphylococcus, not s. saprophyticus UCx 07/2023 - mixed genital [place]  12/03/22 UA - pyuria , epithelial cells and bacteruria  CT abdomen pelvis with iv contrast 12/2022 images visualized -  no kidney stones , no hydronephrosis bilaterally   Renal/bladder ultrasound, from 6/28/2022, demonstrates 3 mm lower pole calculus.  Urinary bladder prevoid 260 cc postvoid 17 cc images visualized agree w findings   KUB images from 3/2022: No definitive calcifications overlying the expected regions of the kidneys. IUD overlying the pelvis. Nonobstructive bowel gas pattern. KUB from 12/2021 images visualized: 6-7 mm right lower pole renal calculus.  Urine culture E. coli greater than 100,000 Urinalysis negative for lab threshold microscopic hematuria  Additionally, she has history of renal stone. Renal ultrasound images from 9/22/2021, demonstrates right lower pole stone, 7 x 6 mm. Urinary bladder demonstrates bilateral ureteral jets with a prevoid volume of 246 cc and the postvoid volume of 41 cc. No hydronephrosis bilaterally.  KUB x-ray, from 10/12/2021 demonstrates a 7 mm calcification over the right renal shadow, which may represent calculus.   Renal/bladder ultrasound images demonstrated no hydronephrosis bilaterally with a lower pole 7 x 6 mm focus, possibly nonobstructing stone, in the right lower pole. 9/2021   PMH: reports at least 1 UTI per year  Family History: No  malignancies Social History: . Vaginal [place]. Consumes 3 coffees per day.  Hookah smoker. Allergic to Cipro. Daughter Pharmacy major.   UCx:  8/26/2021: E.Coli resist to cipro/levaquin 7/08/2021: E.Coli  6/30/2021: E. Coli  5/26/2021 E.Coli

## 2024-02-15 ENCOUNTER — APPOINTMENT (OUTPATIENT)
Dept: UROLOGY | Facility: CLINIC | Age: 44
End: 2024-02-15

## 2025-09-25 PROBLEM — M54.50 LOW BACK PAIN: Status: ACTIVE | Noted: 2025-09-25
